# Patient Record
Sex: MALE | Race: WHITE | NOT HISPANIC OR LATINO | Employment: FULL TIME | URBAN - METROPOLITAN AREA
[De-identification: names, ages, dates, MRNs, and addresses within clinical notes are randomized per-mention and may not be internally consistent; named-entity substitution may affect disease eponyms.]

---

## 2018-08-30 PROBLEM — J45.909 ASTHMA: Status: ACTIVE | Noted: 2017-11-27

## 2018-09-04 ENCOUNTER — TELEPHONE (OUTPATIENT)
Dept: PRIMARY CARE | Facility: CLINIC | Age: 23
End: 2018-09-04

## 2018-09-04 NOTE — TELEPHONE ENCOUNTER
Pt called in 9/1/18 c/o L testicle pain that he was seen for but now was getting progressively worse so was advised to return to ER for reeval.

## 2018-11-01 ENCOUNTER — OFFICE VISIT (OUTPATIENT)
Dept: FAMILY MEDICINE | Facility: CLINIC | Age: 23
End: 2018-11-01
Payer: COMMERCIAL

## 2018-11-01 VITALS
HEIGHT: 72 IN | SYSTOLIC BLOOD PRESSURE: 126 MMHG | TEMPERATURE: 98.5 F | BODY MASS INDEX: 24.24 KG/M2 | DIASTOLIC BLOOD PRESSURE: 69 MMHG | OXYGEN SATURATION: 98 % | HEART RATE: 74 BPM | WEIGHT: 179 LBS

## 2018-11-01 DIAGNOSIS — F41.9 ANXIETY: Primary | ICD-10-CM

## 2018-11-01 PROCEDURE — 99213 OFFICE O/P EST LOW 20 MIN: CPT | Performed by: FAMILY MEDICINE

## 2018-11-01 ASSESSMENT — ENCOUNTER SYMPTOMS
FEVER: 0
CHILLS: 0
NERVOUS/ANXIOUS: 1
SHORTNESS OF BREATH: 1
PALPITATIONS: 1
FATIGUE: 0

## 2018-11-01 NOTE — PROGRESS NOTES
[address] [address]  [phone]       Reason for visit: No chief complaint on file.     HPI   Laurent Gray is a 23 y.o. male who presents with mental health issues   - A/D  Feeling overwhelmed - took new job, pay cut, moved out of parents house  Last week felt like he was getting low  Woke up in the AM feeling anxious  Not sleeping well  Increased heart racing, SOB  Wants to get out of ahead of it before it gets worse  Happened in college sometimes and it would paralyze him - not able to complete tasks  Sometimes going to gym helps him - but at times he can't concentrate and needs to leave early  Has never seen therapist before  Has never taken medication before     Past Medical History:   Diagnosis Date   • Asthma      History reviewed. No pertinent surgical history.  Social History     Social History   • Marital status: Single     Spouse name: N/A   • Number of children: N/A   • Years of education: N/A     Occupational History   • Not on file.     Social History Main Topics   • Smoking status: Never Smoker   • Smokeless tobacco: Never Used   • Alcohol use Yes      Comment: socially   • Drug use: No   • Sexual activity: Not on file     Other Topics Concern   • Not on file     Social History Narrative    Do you wear your seatbelt? Yes    Do you have smoke detector in your home? Yes    Do you have a carbon monoxide detector in your home? 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 single         History reviewed. No pertinent family history.  No known allergies  No current outpatient prescriptions on file.     No current facility-administered medications for this visit.        Review of Systems   Constitutional: Negative for chills, fatigue and fever.   Respiratory: Positive for shortness of breath.    Cardiovascular: Positive for palpitations. Negative for chest pain.   Psychiatric/Behavioral: The patient is  nervous/anxious.      Objective   Vitals:    11/01/18 1303   BP: 126/69   BP Location: Left upper arm   Patient Position: Sitting   Pulse: 74   Temp: 36.9 °C (98.5 °F)   TempSrc: Oral   SpO2: 98%   Weight: 81.2 kg (179 lb)   Height: 1.829 m (6')       Physical Exam   Constitutional: He appears well-developed and well-nourished.   Cardiovascular: Normal rate, regular rhythm and normal heart sounds.  Exam reveals no gallop and no friction rub.    No murmur heard.  Pulmonary/Chest: Effort normal and breath sounds normal. He has no wheezes. He has no rales.   Psychiatric: He has a normal mood and affect. His behavior is normal. Judgment and thought content normal.   Vitals reviewed.      Procedures    Lab Results   Component Value Date    WBC 5.1 12/07/2017    HGB 14.8 12/07/2017    HCT 42.2 12/07/2017     12/07/2017    CHOL 186 12/07/2017    TRIG 47 12/07/2017    HDL 79 12/07/2017    ALT 18 12/07/2017    AST 26 12/07/2017     12/07/2017    K 4.2 12/07/2017     12/07/2017    CREATININE 0.94 12/07/2017    BUN 19 12/07/2017    CO2 25 12/07/2017         Assessment   Problem List Items Addressed This Visit     None      Visit Diagnoses     Anxiety    -  Primary    New Problem  Stressed  Feeling overwhelmed  Increased HR, some SOB  Happened before, was debilitating  Will see therapist  Will consider meds              Sebastien Zamora MD  11/1/2018

## 2018-11-01 NOTE — PATIENT INSTRUCTIONS
Schedule appointment with therapist  Let me know if you want to start medication  Make sure you are exercising frequently  Make sure you are getting enough sleep  Limit coffee intake  No energy drinks

## 2019-01-14 ENCOUNTER — OFFICE VISIT (OUTPATIENT)
Dept: FAMILY MEDICINE | Facility: CLINIC | Age: 24
End: 2019-01-14
Payer: COMMERCIAL

## 2019-01-14 VITALS
OXYGEN SATURATION: 98 % | HEIGHT: 70 IN | DIASTOLIC BLOOD PRESSURE: 68 MMHG | SYSTOLIC BLOOD PRESSURE: 126 MMHG | HEART RATE: 63 BPM | TEMPERATURE: 97.4 F | BODY MASS INDEX: 26.28 KG/M2 | WEIGHT: 183.6 LBS

## 2019-01-14 DIAGNOSIS — F41.1 GENERALIZED ANXIETY DISORDER: Primary | ICD-10-CM

## 2019-01-14 PROCEDURE — 99214 OFFICE O/P EST MOD 30 MIN: CPT | Performed by: FAMILY MEDICINE

## 2019-01-14 RX ORDER — SERTRALINE HYDROCHLORIDE 25 MG/1
25 TABLET, FILM COATED ORAL DAILY
Qty: 30 TABLET | Refills: 2 | Status: SHIPPED | OUTPATIENT
Start: 2019-01-14 | End: 2019-03-27 | Stop reason: SDUPTHER

## 2019-01-14 ASSESSMENT — ENCOUNTER SYMPTOMS
HYPERACTIVE: 1
PALPITATIONS: 1
SHORTNESS OF BREATH: 1
DYSPHORIC MOOD: 0
CHILLS: 0
NERVOUS/ANXIOUS: 1
FEVER: 0

## 2019-01-14 NOTE — PATIENT INSTRUCTIONS
Start Sertraline 25 mg daily  Side effects were reviewed  Let me know if anything makes you not want to take it anymore  Come back in 4-6 weeks for physical and check

## 2019-01-14 NOTE — ASSESSMENT & PLAN NOTE
New Problem  Chronic  Has seen therapist - no real improvement  Has changed a lot of things and has not noticed  Lots of triggers  Affecting his daily life  Sertraline 25 daily  SE reviewed  Dad is on this  RTO 4-6 w

## 2019-01-14 NOTE — PROGRESS NOTES
[facility]  [address] [place] [id number]  [phone]       Reason for visit:   Chief Complaint   Patient presents with   • Anxiety      HPI   Laurent Gray is a 23 y.o. male who presents with anxiety   - Anxiety  No improvement since last appt 10 w ago  Saw therapist - does not feel like it is helping  Feels better for 24 hours then back to baseline anxiety  Trouble getting relaxed  Anxious  Triggers - applying to grad school, going to the gym, work  Gym does not help, has changed jobs, moved out of house - no relief  Will change his environment and that doesn't help - going home, parents house  Has not been on medication     Past Medical History:   Diagnosis Date   • Asthma      History reviewed. No pertinent surgical history.  Social History     Social History   • Marital status: Single     Spouse name: N/A   • Number of children: N/A   • Years of education: N/A     Occupational History   • Not on file.     Social History Main Topics   • Smoking status: Never Smoker   • Smokeless tobacco: Never Used   • Alcohol use Yes      Comment: socially   • Drug use: No   • Sexual activity: Not on file     Other Topics Concern   • Not on file     Social History Narrative    Do you wear your seatbelt? Yes    Do you have smoke detector in your home? Yes    Do you have a carbon monoxide detector in your home? 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 single         History reviewed. No pertinent family history.  No known allergies  Current Outpatient Prescriptions   Medication Sig Dispense Refill   • sertraline (ZOLOFT) 25 mg tablet Take 1 tablet (25 mg total) by mouth daily. 30 tablet 2     No current facility-administered medications for this visit.        Review of Systems   Constitutional: Negative for chills and fever.   Respiratory: Positive for shortness of breath.    Cardiovascular: Positive for palpitations. Negative for chest pain.  "  Psychiatric/Behavioral: Negative for dysphoric mood. The patient is nervous/anxious and is hyperactive.      Objective   Vitals:    01/14/19 1604   BP: 126/68   BP Location: Left upper arm   Patient Position: Sitting   Pulse: 63   Temp: 36.3 °C (97.4 °F)   TempSrc: Oral   SpO2: 98%   Weight: 83.3 kg (183 lb 9.6 oz)   Height: 1.778 m (5' 10\")       Physical Exam   Constitutional: He appears well-developed and well-nourished.   Psychiatric: He has a normal mood and affect. His behavior is normal. Judgment and thought content normal.   anxious   Vitals reviewed.      Procedures    Lab Results   Component Value Date    WBC 5.1 12/07/2017    HGB 14.8 12/07/2017    HCT 42.2 12/07/2017     12/07/2017    CHOL 186 12/07/2017    TRIG 47 12/07/2017    HDL 79 12/07/2017    ALT 18 12/07/2017    AST 26 12/07/2017     12/07/2017    K 4.2 12/07/2017     12/07/2017    CREATININE 0.94 12/07/2017    BUN 19 12/07/2017    CO2 25 12/07/2017         Assessment   Problem List Items Addressed This Visit     Generalized anxiety disorder - Primary     New Problem  Chronic  Has seen therapist - no real improvement  Has changed a lot of things and has not noticed  Lots of triggers  Affecting his daily life  Sertraline 25 daily  SE reviewed  Dad is on this  RTO 4-6 w         Relevant Medications    sertraline (ZOLOFT) 25 mg tablet              Sebastien Zamora MD  1/14/2019                     "

## 2019-02-19 ENCOUNTER — OFFICE VISIT (OUTPATIENT)
Dept: FAMILY MEDICINE | Facility: CLINIC | Age: 24
End: 2019-02-19
Payer: COMMERCIAL

## 2019-02-19 VITALS
SYSTOLIC BLOOD PRESSURE: 144 MMHG | BODY MASS INDEX: 26.26 KG/M2 | OXYGEN SATURATION: 98 % | HEART RATE: 66 BPM | TEMPERATURE: 97.5 F | WEIGHT: 183.4 LBS | HEIGHT: 70 IN | DIASTOLIC BLOOD PRESSURE: 66 MMHG

## 2019-02-19 DIAGNOSIS — F41.1 GENERALIZED ANXIETY DISORDER: Primary | ICD-10-CM

## 2019-02-19 PROCEDURE — 99213 OFFICE O/P EST LOW 20 MIN: CPT | Performed by: FAMILY MEDICINE

## 2019-02-19 ASSESSMENT — ENCOUNTER SYMPTOMS
FATIGUE: 0
DYSPHORIC MOOD: 0
FEVER: 0
NERVOUS/ANXIOUS: 1
CHILLS: 0

## 2019-02-19 NOTE — PATIENT INSTRUCTIONS
Glad you have noticed such a great improvement  Continue with medication daily  Come back and see me in 3-4 months for physical  Let me know if any issues

## 2019-02-19 NOTE — PROGRESS NOTES
[address] [address]  [phone]       Reason for visit:   Chief Complaint   Patient presents with   • Follow-up      HPI   Laurent Gray is a 23 y.o. male who presents with anxiety   - Anxiety  Started on Zoloft 5 weeks ago  Feeling better  Less anxious when waking up  Frequency of anxiety less  Less severe  Not seeing therapist anymore  Does not notice any side effects with medication     Past Medical History:   Diagnosis Date   • Asthma      History reviewed. No pertinent surgical history.  Social History     Social History   • Marital status: Single     Spouse name: N/A   • Number of children: N/A   • Years of education: N/A     Occupational History   • Not on file.     Social History Main Topics   • Smoking status: Never Smoker   • Smokeless tobacco: Never Used   • Alcohol use Yes      Comment: socially   • Drug use: No   • Sexual activity: Not on file     Other Topics Concern   • Not on file     Social History Narrative    Do you wear your seatbelt? Yes    Do you have smoke detector in your home? Yes    Do you have a carbon monoxide detector in your home? 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 single         Family History   Problem Relation Age of Onset   • Hypertension Mother    • Anxiety disorder Father      No known allergies  Current Outpatient Prescriptions   Medication Sig Dispense Refill   • sertraline (ZOLOFT) 25 mg tablet Take 1 tablet (25 mg total) by mouth daily. 30 tablet 2     No current facility-administered medications for this visit.        Review of Systems   Constitutional: Negative for chills, fatigue and fever.   Psychiatric/Behavioral: Negative for dysphoric mood. The patient is nervous/anxious.      Objective   Vitals:    02/19/19 1606   BP: (!) 144/66   BP Location: Left upper arm   Patient Position: Sitting   Pulse: 66   Temp: 36.4 °C (97.5 °F)   TempSrc: Oral   SpO2: 98%   Weight: 83.2  "kg (183 lb 6.4 oz)   Height: 1.778 m (5' 10\")       Physical Exam   Constitutional: He appears well-developed and well-nourished.   Psychiatric: He has a normal mood and affect. His behavior is normal. Judgment and thought content normal.   Vitals reviewed.      Procedures    Lab Results   Component Value Date    WBC 5.1 12/07/2017    HGB 14.8 12/07/2017    HCT 42.2 12/07/2017     12/07/2017    CHOL 186 12/07/2017    TRIG 47 12/07/2017    HDL 79 12/07/2017    ALT 18 12/07/2017    AST 26 12/07/2017     12/07/2017    K 4.2 12/07/2017     12/07/2017    CREATININE 0.94 12/07/2017    BUN 19 12/07/2017    CO2 25 12/07/2017         Assessment   Problem List Items Addressed This Visit     Generalized anxiety disorder - Primary     Chronic  Stable  Feeling much better  Taking Zoloft 25 daily  Denies side effects                   Sebastien Zamora MD  2/19/2019                     "

## 2019-03-07 ENCOUNTER — TELEPHONE (OUTPATIENT)
Dept: FAMILY MEDICINE | Facility: CLINIC | Age: 24
End: 2019-03-07

## 2019-03-07 NOTE — TELEPHONE ENCOUNTER
Spoke to patient, he is going thru a rough patch and wanted advice on next step.  Next step would be increasing Zoloft to 50, he will wait it out and see if sxs improve over the next 2 weeks and if no improvement will increase dose to 2 pills daily and follow up 4 w later

## 2019-03-27 ENCOUNTER — TELEPHONE (OUTPATIENT)
Dept: FAMILY MEDICINE | Facility: CLINIC | Age: 24
End: 2019-03-27

## 2019-03-27 DIAGNOSIS — F41.1 GENERALIZED ANXIETY DISORDER: ICD-10-CM

## 2019-03-27 RX ORDER — SERTRALINE HYDROCHLORIDE 25 MG/1
37.5 TABLET, FILM COATED ORAL DAILY
Qty: 30 TABLET | Refills: 2 | COMMUNITY
Start: 2019-03-27 | End: 2019-04-01

## 2019-06-24 ENCOUNTER — TELEPHONE (OUTPATIENT)
Dept: FAMILY MEDICINE | Facility: CLINIC | Age: 24
End: 2019-06-24
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
## 2019-06-24 NOTE — TELEPHONE ENCOUNTER
Spoke to patient.  He will try drinking milk, taking Tums and or Pepto. He will let me know if no improvement or worsening of symptoms

## 2019-07-08 DIAGNOSIS — F41.1 GENERALIZED ANXIETY DISORDER: ICD-10-CM

## 2019-07-08 RX ORDER — SERTRALINE HYDROCHLORIDE 25 MG/1
37.5 TABLET, FILM COATED ORAL
Qty: 45 TABLET | Refills: 2 | Status: SHIPPED | OUTPATIENT
Start: 2019-07-08 | End: 2019-10-16 | Stop reason: SDUPTHER

## 2019-07-17 ENCOUNTER — TELEPHONE (OUTPATIENT)
Dept: FAMILY MEDICINE | Facility: CLINIC | Age: 24
End: 2019-07-17

## 2019-08-12 ENCOUNTER — TELEPHONE (OUTPATIENT)
Dept: FAMILY MEDICINE | Facility: CLINIC | Age: 24
End: 2019-08-12

## 2019-08-14 ENCOUNTER — OFFICE VISIT (OUTPATIENT)
Dept: FAMILY MEDICINE | Facility: CLINIC | Age: 24
End: 2019-08-14
Payer: COMMERCIAL

## 2019-08-14 ENCOUNTER — HOSPITAL ENCOUNTER (OUTPATIENT)
Dept: RADIOLOGY | Facility: HOSPITAL | Age: 24
Discharge: HOME | End: 2019-08-14
Attending: NURSE PRACTITIONER
Payer: COMMERCIAL

## 2019-08-14 VITALS
TEMPERATURE: 98.7 F | WEIGHT: 193.4 LBS | OXYGEN SATURATION: 98 % | DIASTOLIC BLOOD PRESSURE: 85 MMHG | SYSTOLIC BLOOD PRESSURE: 132 MMHG | HEIGHT: 70 IN | HEART RATE: 67 BPM | BODY MASS INDEX: 27.69 KG/M2

## 2019-08-14 DIAGNOSIS — S09.92XA INJURY OF NOSE, INITIAL ENCOUNTER: ICD-10-CM

## 2019-08-14 DIAGNOSIS — S69.91XD HAND INJURY, RIGHT, SUBSEQUENT ENCOUNTER: ICD-10-CM

## 2019-08-14 DIAGNOSIS — S09.92XA INJURY OF NOSE, INITIAL ENCOUNTER: Primary | ICD-10-CM

## 2019-08-14 PROCEDURE — 70160 X-RAY EXAM OF NASAL BONES: CPT

## 2019-08-14 PROCEDURE — 99213 OFFICE O/P EST LOW 20 MIN: CPT | Performed by: NURSE PRACTITIONER

## 2019-08-14 ASSESSMENT — ENCOUNTER SYMPTOMS
ACTIVITY CHANGE: 0
HEADACHES: 0
SHORTNESS OF BREATH: 0
DIZZINESS: 0
LIGHT-HEADEDNESS: 0
CHILLS: 0
FACIAL SWELLING: 0
WOUND: 0
NUMBNESS: 0
FEVER: 0
FATIGUE: 0
APPETITE CHANGE: 0
RHINORRHEA: 0
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
## 2019-10-09 ENCOUNTER — OFFICE VISIT (OUTPATIENT)
Dept: FAMILY MEDICINE | Facility: CLINIC | Age: 24
End: 2019-10-09
Payer: COMMERCIAL

## 2019-10-09 VITALS
DIASTOLIC BLOOD PRESSURE: 80 MMHG | WEIGHT: 191.8 LBS | TEMPERATURE: 98.5 F | HEIGHT: 70 IN | HEART RATE: 71 BPM | OXYGEN SATURATION: 97 % | BODY MASS INDEX: 27.46 KG/M2 | SYSTOLIC BLOOD PRESSURE: 126 MMHG

## 2019-10-09 DIAGNOSIS — S69.91XA INJURY OF FINGER OF RIGHT HAND, INITIAL ENCOUNTER: Primary | ICD-10-CM

## 2019-10-09 PROCEDURE — 99213 OFFICE O/P EST LOW 20 MIN: CPT | Performed by: NURSE PRACTITIONER

## 2019-10-09 ASSESSMENT — ENCOUNTER SYMPTOMS
FATIGUE: 0
FEVER: 0
ACTIVITY CHANGE: 0
APPETITE CHANGE: 0
CHILLS: 0

## 2019-10-09 NOTE — PROGRESS NOTES
[place] [address]  [phone]       Reason for visit:   Chief Complaint   Patient presents with   • Hand Pain     right      HPI   Laurent Gray is a 23 y.o. male who presents with right hand injury  Had a previous metacarpal fracture in 8/2019.   He admits that he was not supposed to use his hand for at least another week but was played flag football last night and ended up injuring the same hand.   Right hand 2nd, 3rd and 4th digits: swollen, bruised, DIP tender to touch. Pt with very limited ROM, unable to extend fingers.          Past Medical History:   Diagnosis Date   • Asthma      History reviewed. No pertinent surgical history.  Social History     Social History   • Marital status: Single     Spouse name: N/A   • Number of children: N/A   • Years of education: N/A     Occupational History   • Not on file.     Social History Main Topics   • Smoking status: Never Smoker   • Smokeless tobacco: Never Used   • Alcohol use Yes      Comment: socially   • Drug use: No   • Sexual activity: Not on file     Other Topics Concern   • Not on file     Social History Narrative    Do you wear your seatbelt? Yes    Do you have smoke detector in your home? Yes    Do you have a carbon monoxide detector in your home? 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 single         Family History   Problem Relation Age of Onset   • Hypertension Biological Mother    • Anxiety disorder Biological Father      No known allergies  Current Outpatient Prescriptions   Medication Sig Dispense Refill   • sertraline (ZOLOFT) 25 mg tablet TAKE 1.5 TABLETS (37.5 MG TOTAL) BY MOUTH ONCE DAILY. 45 tablet 2     No current facility-administered medications for this visit.        Review of Systems   Constitutional: Negative for activity change, appetite change, chills, fatigue and fever.   Musculoskeletal:        + Right hand injury, swollen painful fingers      Objective   Vitals:    10/09/19 1449   BP: 126/80   Pulse: 71   Temp: 36.9 °C (98.5 °F)   SpO2: 97%       Physical Exam   Constitutional: He is oriented to person, place, and time. Vital signs are normal. He appears well-developed and well-nourished. He is cooperative.  Non-toxic appearance. He does not have a sickly appearance. He does not appear ill. No distress.   Musculoskeletal:   + right hand - 2nd, 3rd, 4th digits swollen; ecchymosis, tender DIP joints   Neurological: He is alert and oriented to person, place, and time.   Skin: Skin is warm and dry.   Psychiatric: He has a normal mood and affect. His behavior is normal. Judgment and thought content normal.   Nursing note and vitals reviewed.      Procedures        Assessment   Problem List Items Addressed This Visit     None      Visit Diagnoses     Injury of finger of right hand, initial encounter    -  Primary    Relevant Orders    X-RAY FINGER RIGHT 2+ VIEWS      - Facilitated appointment at [facility] tomorrow 2 PM.  - Pt instructed to elevate extremity, ice application and Ibuprofen PRN with food        MORALES Thomson  10/9/2019

## 2019-10-16 DIAGNOSIS — F41.1 GENERALIZED ANXIETY DISORDER: ICD-10-CM

## 2019-10-16 RX ORDER — SERTRALINE HYDROCHLORIDE 25 MG/1
37.5 TABLET, FILM COATED ORAL
Qty: 45 TABLET | Refills: 2 | Status: SHIPPED | OUTPATIENT
Start: 2019-10-16 | End: 2019-11-07 | Stop reason: SDUPTHER

## 2019-11-07 DIAGNOSIS — F41.1 GENERALIZED ANXIETY DISORDER: ICD-10-CM

## 2019-11-07 RX ORDER — SERTRALINE HYDROCHLORIDE 25 MG/1
37.5 TABLET, FILM COATED ORAL
Qty: 135 TABLET | Refills: 0 | Status: SHIPPED | OUTPATIENT
Start: 2019-11-07 | End: 2020-01-30

## 2020-01-29 DIAGNOSIS — F41.1 GENERALIZED ANXIETY DISORDER: ICD-10-CM

## 2020-01-29 NOTE — TELEPHONE ENCOUNTER
Medicine Refill Request    Last Office Visit: 10/9/2019  Next Office Visit: Visit date not found        Current Outpatient Medications:   •  sertraline (ZOLOFT) 25 mg tablet, Take 1.5 tablets (37.5 mg total) by mouth once daily., Disp: 135 tablet, Rfl: 0      BP Readings from Last 3 Encounters:   10/09/19 126/80   08/14/19 132/85   02/19/19 (!) 144/66       Recent Lab results:  Lab Results   Component Value Date    CHOL 186 12/07/2017   ,   Lab Results   Component Value Date    HDL 79 12/07/2017   ,   Lab Results   Component Value Date    LDLCALC 98 12/07/2017   ,   Lab Results   Component Value Date    TRIG 47 12/07/2017        Lab Results   Component Value Date    GLUCOSE 86 12/07/2017   , No results found for: HGBA1C      Lab Results   Component Value Date    CREATININE 0.94 12/07/2017       No results found for: TSH

## 2020-01-30 RX ORDER — SERTRALINE HYDROCHLORIDE 25 MG/1
37.5 TABLET, FILM COATED ORAL
Qty: 135 TABLET | Refills: 0 | Status: SHIPPED | OUTPATIENT
Start: 2020-01-30 | End: 2020-04-27

## 2020-02-04 ENCOUNTER — TELEPHONE (OUTPATIENT)
Dept: FAMILY MEDICINE | Facility: CLINIC | Age: 25
End: 2020-02-04

## 2020-02-13 ENCOUNTER — OFFICE VISIT (OUTPATIENT)
Dept: FAMILY MEDICINE | Facility: CLINIC | Age: 25
End: 2020-02-13
Payer: COMMERCIAL

## 2020-02-13 VITALS
OXYGEN SATURATION: 99 % | SYSTOLIC BLOOD PRESSURE: 134 MMHG | DIASTOLIC BLOOD PRESSURE: 64 MMHG | BODY MASS INDEX: 28.77 KG/M2 | WEIGHT: 201 LBS | HEART RATE: 68 BPM | TEMPERATURE: 97.5 F | HEIGHT: 70 IN

## 2020-02-13 DIAGNOSIS — Z00.00 ENCOUNTER FOR GENERAL ADULT MEDICAL EXAMINATION WITHOUT ABNORMAL FINDINGS: Primary | ICD-10-CM

## 2020-02-13 PROCEDURE — 90471 IMMUNIZATION ADMIN: CPT | Performed by: FAMILY MEDICINE

## 2020-02-13 PROCEDURE — 99395 PREV VISIT EST AGE 18-39: CPT | Mod: 25 | Performed by: FAMILY MEDICINE

## 2020-02-13 PROCEDURE — 90651 9VHPV VACCINE 2/3 DOSE IM: CPT | Performed by: FAMILY MEDICINE

## 2020-02-13 ASSESSMENT — ENCOUNTER SYMPTOMS
CONSTIPATION: 0
ADENOPATHY: 0
BLOOD IN STOOL: 0
FEVER: 0
NAUSEA: 0
WEAKNESS: 0
DYSURIA: 0
BACK PAIN: 0
NUMBNESS: 0
SORE THROAT: 0
ABDOMINAL PAIN: 0
DIZZINESS: 0
PALPITATIONS: 0
ARTHRALGIAS: 0
VOMITING: 0
FREQUENCY: 0
DIARRHEA: 0
SHORTNESS OF BREATH: 0
CHILLS: 1
RHINORRHEA: 1
COUGH: 1

## 2020-02-13 NOTE — PROGRESS NOTES
[place] [address]  SAMANTHA Correa [address]  [phone]       Reason for visit:   Chief Complaint   Patient presents with   • Annual Exam      HPI   Laurent Gray is a 24 y.o. male who presents with CPX   Updates Yes 10 lb weight gain in 4 months.  20 lbs in 1 year. Unsure how. 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 No pertinent surgical history.  Social History     Socioeconomic History   • Marital status: Single     Spouse name: Not on file   • Number of children: Not on file   • Years of education: Not on file   • Highest education level: Not on file   Occupational History   • Not on file   Social Needs   • Financial resource strain: Not on file   • Food insecurity:     Worry: Not on file     Inability: Not on file   • Transportation needs:     Medical: Not on file     Non-medical: Not on file   Tobacco Use   • Smoking status: Never Smoker   • Smokeless tobacco: Never Used   Substance and Sexual Activity   • Alcohol use: Yes     Comment: 3-4/week   • Drug use: No   • Sexual activity: Yes     Partners: Female     Birth control/protection: IUD   Lifestyle   • Physical activity:     Days per week: Not on file     Minutes per session: Not on file   • Stress: Not on file   Relationships   • Social connections:     Talks on phone: Not on file     Gets together: Not on file     Attends Synagogue service: Not on file     Active member of club or organization: Not on file     Attends meetings of clubs or organizations: Not on file     Relationship status: Not on file   • Intimate partner violence:     Fear of current or ex partner:  "Not on file     Emotionally abused: Not on file     Physically abused: Not on file     Forced sexual activity: Not on file   Other Topics Concern   • Not on file   Social History Narrative    Do you wear your seatbelt? Yes    Do you have smoke detector in your home? Yes    Do you have a carbon monoxide detector in your home? 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 single     Family History   Problem Relation Age of Onset   • Hypertension Biological Mother    • Anxiety disorder Biological Father    • Rectal cancer Maternal Grandmother    • Cancer Paternal Grandfather      No known allergies  Current Outpatient Medications   Medication Sig Dispense Refill   • sertraline (ZOLOFT) 25 mg tablet TAKE 1.5 TABLETS (37.5 MG TOTAL) BY MOUTH ONCE DAILY. 135 tablet 0     No current facility-administered medications for this visit.        Review of Systems   Constitutional: Positive for chills. Negative for fever.   HENT: Positive for rhinorrhea. Negative for congestion and sore throat.    Respiratory: Positive for cough (recent URI). Negative for shortness of breath.    Cardiovascular: Negative for chest pain and palpitations.   Gastrointestinal: Negative for abdominal pain, blood in stool, constipation, diarrhea, nausea and vomiting.   Genitourinary: Negative for dysuria, frequency and testicular pain.   Musculoskeletal: Negative for arthralgias and back pain.   Skin: Negative for rash.   Allergic/Immunologic: Negative for environmental allergies and food allergies.   Neurological: Negative for dizziness, weakness and numbness.   Hematological: Negative for adenopathy.     Objective   Vitals:    02/13/20 1551   BP: 134/64   BP Location: Left upper arm   Patient Position: Sitting   Pulse: 68   Temp: 36.4 °C (97.5 °F)   TempSrc: Oral   SpO2: 99%   Weight: 91.2 kg (201 lb)   Height: 1.778 m (5' 10\")       Physical Exam   Constitutional: He is oriented to person, " place, and time. He appears well-developed and well-nourished.   HENT:   Head: Normocephalic and atraumatic.   Right Ear: Tympanic membrane and ear canal normal.   Left Ear: Tympanic membrane and ear canal normal.   Nose: Nose normal.   Mouth/Throat: Oropharynx is clear and moist and mucous membranes are normal.   Eyes: Pupils are equal, round, and reactive to light. Conjunctivae are normal.   Neck: No thyroid mass and no thyromegaly present.   Cardiovascular: Normal rate, regular rhythm, S1 normal, S2 normal and normal pulses. Exam reveals no gallop and no friction rub.   No murmur heard.  Pulses:       Radial pulses are 2+ on the right side, and 2+ on the left side.   Pulmonary/Chest: Effort normal and breath sounds normal. He has no wheezes. He has no rhonchi. He has no rales.   Abdominal: Soft. Normal appearance and bowel sounds are normal. There is no tenderness. There is no rebound and no guarding.   Musculoskeletal: Normal range of motion.   Lymphadenopathy:     He has no cervical adenopathy.   Neurological: He is alert and oriented to person, place, and time. He has normal strength. No cranial nerve deficit.   Skin:   Multiple macules brown and round in color on back   Psychiatric: He has a normal mood and affect. His speech is normal and behavior is normal. Judgment normal. Cognition and memory are normal.   Nursing note and vitals reviewed.      Procedures    Lab Results   Component Value Date    WBC 5.1 12/07/2017    HGB 14.8 12/07/2017    HCT 42.2 12/07/2017     12/07/2017    CHOL 186 12/07/2017    TRIG 47 12/07/2017    HDL 79 12/07/2017    ALT 18 12/07/2017    AST 26 12/07/2017     12/07/2017    K 4.2 12/07/2017     12/07/2017    CREATININE 0.94 12/07/2017    BUN 19 12/07/2017    CO2 25 12/07/2017         Assessment   Problem List Items Addressed This Visit     None      Visit Diagnoses     Encounter for general adult medical examination without abnormal findings    -  Primary     Adult Male  Notes weight gain, mole?  Active  Eats well  Social EtoH  PMH Anx  FHX MGM rectal; PGF cancer  UTD labs  HPV #1    Relevant Orders    HPV vaccine 9 valent 3 dose IM              Sebastien Zamora MD  2/13/2020

## 2020-02-13 NOTE — PATIENT INSTRUCTIONS
Diet - Try to limit portion sizes, take out, fast food, processed foods. Alcohol can be a expensive source of calories! If these are current problems for you, pick one area and focus on that first!    Exercise - Goal should be 30-40 minutes, 3-4 times a week. If you are currently not exercising, set reachable goals with short term deadlines!    Preventative Care Due - None    Labs Due Today - None    Shots Due Today - HPV #1    Follow up - 1 year or sooner if anything comes up    Make sure you are seeing your specialists, Eye Doctor, Foot Doctor, OBGYN yearly!

## 2020-06-02 ENCOUNTER — CLINICAL SUPPORT (OUTPATIENT)
Dept: FAMILY MEDICINE | Facility: CLINIC | Age: 25
End: 2020-06-02
Payer: COMMERCIAL

## 2020-06-02 ENCOUNTER — TELEPHONE (OUTPATIENT)
Dept: FAMILY MEDICINE | Facility: CLINIC | Age: 25
End: 2020-06-02

## 2020-06-02 PROCEDURE — 90746 HEPB VACCINE 3 DOSE ADULT IM: CPT | Performed by: FAMILY MEDICINE

## 2020-06-02 PROCEDURE — 90471 IMMUNIZATION ADMIN: CPT | Performed by: FAMILY MEDICINE

## 2020-07-09 ENCOUNTER — OFFICE VISIT (OUTPATIENT)
Dept: FAMILY MEDICINE | Facility: CLINIC | Age: 25
End: 2020-07-09
Payer: COMMERCIAL

## 2020-07-09 VITALS
WEIGHT: 206.6 LBS | BODY MASS INDEX: 29.58 KG/M2 | HEIGHT: 70 IN | SYSTOLIC BLOOD PRESSURE: 137 MMHG | HEART RATE: 60 BPM | DIASTOLIC BLOOD PRESSURE: 76 MMHG | TEMPERATURE: 97.6 F | OXYGEN SATURATION: 98 %

## 2020-07-09 DIAGNOSIS — F41.1 GENERALIZED ANXIETY DISORDER: ICD-10-CM

## 2020-07-09 DIAGNOSIS — K64.4 EXTERNAL HEMORRHOIDS: ICD-10-CM

## 2020-07-09 DIAGNOSIS — R63.5 ABNORMAL WEIGHT GAIN: Primary | ICD-10-CM

## 2020-07-09 PROCEDURE — 99214 OFFICE O/P EST MOD 30 MIN: CPT | Mod: 25 | Performed by: FAMILY MEDICINE

## 2020-07-09 PROCEDURE — 90746 HEPB VACCINE 3 DOSE ADULT IM: CPT | Performed by: FAMILY MEDICINE

## 2020-07-09 PROCEDURE — 36415 COLL VENOUS BLD VENIPUNCTURE: CPT | Performed by: FAMILY MEDICINE

## 2020-07-09 PROCEDURE — 90651 9VHPV VACCINE 2/3 DOSE IM: CPT | Performed by: FAMILY MEDICINE

## 2020-07-09 PROCEDURE — 90471 IMMUNIZATION ADMIN: CPT | Performed by: FAMILY MEDICINE

## 2020-07-09 PROCEDURE — 90472 IMMUNIZATION ADMIN EACH ADD: CPT | Performed by: FAMILY MEDICINE

## 2020-07-09 ASSESSMENT — ENCOUNTER SYMPTOMS
BLOOD IN STOOL: 0
ANAL BLEEDING: 1
NUMBNESS: 0
APPETITE CHANGE: 0
HEADACHES: 0
RECTAL PAIN: 1
DIZZINESS: 0
LIGHT-HEADEDNESS: 0
WEAKNESS: 0
VOMITING: 0
UNEXPECTED WEIGHT CHANGE: 1
DIARRHEA: 1
NAUSEA: 0
ACTIVITY CHANGE: 0
CONSTIPATION: 0
FATIGUE: 0
ABDOMINAL PAIN: 0

## 2020-07-09 NOTE — PATIENT INSTRUCTIONS
Try Sitz bath for hemorrhoids  Let me know if you decide you want to try a prescription med  Can also see Colorectal  I will call with labs tomorrow for thyroid  It is very possible that your weight gain is related to Zoloft  We can discuss changing medication if labs OK

## 2020-07-09 NOTE — PROGRESS NOTES
[facility]  [address] [place], [place] [id number]  [phone]       Reason for visit:   Chief Complaint   Patient presents with   • Hemorrhoids     Weight gain, hot and cold flashes      HPI   Laurent Gray is a 24 y.o. male who presents with multiple complaints   - Hemorrhoids  Has not had any symptoms in the last day or so  Frequently sees blood on TP  Over the last 1-2 months has been worse  No blood in stool  Painful, itchy, sometimes burns  Has used OTC steroid cream, Prep H, Medicated wipes with WitchHazel  Gets better for a day or so  Then comes back  Stools are loose more than they are normal  - Temperature Issues/Weight gain  Feels warm all the time  Sweats all day long  Has noticed 15 lb weight gain since 10/2019  23 lb weight gain since 2/2019  Active - works outside a lot; has been doing weights the last week  Peloton since March, running at home  Has been seeing dietician - oatmeal, grilled chicken, veggies, yogurt, nuts     Past Medical History:   Diagnosis Date   • Anxiety    • Asthma      History reviewed. No pertinent surgical history.  Social History     Socioeconomic History   • Marital status: Single     Spouse name: Not on file   • Number of children: Not on file   • Years of education: Not on file   • Highest education level: Not on file   Occupational History   • Not on file   Social Needs   • Financial resource strain: Not on file   • Food insecurity:     Worry: Not on file     Inability: Not on file   • Transportation needs:     Medical: Not on file     Non-medical: Not on file   Tobacco Use   • Smoking status: Never Smoker   • Smokeless tobacco: Never Used   Substance and Sexual Activity   • Alcohol use: Yes     Comment: 3-4/week   • Drug use: No   • Sexual activity: Yes     Partners: Female     Birth control/protection: IUD   Lifestyle   • Physical activity:     Days per week: Not on file     Minutes per session: Not on file   • Stress: Not  on file   Relationships   • Social connections:     Talks on phone: Not on file     Gets together: Not on file     Attends Hinduism service: Not on file     Active member of club or organization: Not on file     Attends meetings of clubs or organizations: Not on file     Relationship status: Not on file   • Intimate partner violence:     Fear of current or ex partner: Not on file     Emotionally abused: Not on file     Physically abused: Not on file     Forced sexual activity: Not on file   Other Topics Concern   • Not on file   Social History Narrative    Do you wear your seatbelt? Yes    Do you have smoke detector in your home? Yes    Do you have a carbon monoxide detector in your home? 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 single     Family History   Problem Relation Age of Onset   • Hypertension Biological Mother    • Anxiety disorder Biological Father    • Rectal cancer Maternal Grandmother    • Cancer Paternal Grandfather      No known allergies  Current Outpatient Medications   Medication Sig Dispense Refill   • sertraline (ZOLOFT) 25 mg tablet TAKE 1.5 TABLETS (37.5 MG TOTAL) BY MOUTH ONCE DAILY. 135 tablet 1     No current facility-administered medications for this visit.        Review of Systems   Constitutional: Positive for unexpected weight change. Negative for activity change, appetite change and fatigue.   Gastrointestinal: Positive for anal bleeding, diarrhea and rectal pain. Negative for abdominal pain, blood in stool, constipation, nausea and vomiting.   Endocrine: Positive for heat intolerance.   Skin: Negative for rash.   Neurological: Negative for dizziness, syncope, weakness, light-headedness, numbness and headaches.     Objective   Vitals:    07/09/20 0927   BP: 137/76   BP Location: Left upper arm   Patient Position: Sitting   Pulse: 60   Temp: 36.4 °C (97.6 °F)   TempSrc: Temporal   SpO2: 98%   Weight: 93.7 kg (206 lb 9.6 oz)  "  Height: 1.778 m (5' 10\")       Physical Exam   Constitutional: He is oriented to person, place, and time. He appears well-developed and well-nourished.   Neck: No thyromegaly present.   Cardiovascular: Normal rate, regular rhythm, normal heart sounds and intact distal pulses. Exam reveals no gallop and no friction rub.   No murmur heard.  Pulmonary/Chest: Effort normal and breath sounds normal. He has no wheezes. He has no rales.   Abdominal: Soft. Bowel sounds are normal. He exhibits no distension and no mass. There is no tenderness. There is no rebound and no guarding.   Genitourinary:   Genitourinary Comments: Patient defers rectal exam   Musculoskeletal: He exhibits no edema.   Neurological: He is alert and oriented to person, place, and time.   Skin: Skin is warm. Capillary refill takes less than 2 seconds.   Vitals reviewed.      Procedures    Lab Results   Component Value Date    WBC 5.1 12/07/2017    HGB 14.8 12/07/2017    HCT 42.2 12/07/2017     12/07/2017    CHOL 186 12/07/2017    TRIG 47 12/07/2017    HDL 79 12/07/2017    ALT 18 12/07/2017    AST 26 12/07/2017     12/07/2017    K 4.2 12/07/2017     12/07/2017    CREATININE 0.94 12/07/2017    BUN 19 12/07/2017    CO2 25 12/07/2017         Assessment   Problem List Items Addressed This Visit        Other    Generalized anxiety disorder     Chronic  Stable  Controlled  Weight gain since starting  T/C switching to Buspar           Other Visit Diagnoses     Abnormal weight gain    -  Primary    New Problem  23 lbs in 18 mo  15 in in 9 mo  Active  Eats well  Checking TSH  Started Zoloft around time of weight gain  If labs OK consider switching to Buspar    Relevant Orders    TSH w reflex FT4    External hemorrhoids        New Problem  Chronic  Irriating  Off and on  Blood on TP  Has tried OTC  Does not want to get Rx  Sitz baths  ColoRectal if continues              Sebastien Zamora MD  7/9/2020                     "

## 2020-07-10 LAB
T4 FREE SERPL-MCNC: 1.03 NG/DL (ref 0.82–1.77)
TSH SERPL DL<=0.005 MIU/L-ACNC: 1.15 UIU/ML (ref 0.45–4.5)

## 2020-07-13 ENCOUNTER — TELEPHONE (OUTPATIENT)
Dept: FAMILY MEDICINE | Facility: CLINIC | Age: 25
End: 2020-07-13

## 2020-08-02 ENCOUNTER — TELEPHONE (OUTPATIENT)
Dept: PRIMARY CARE | Facility: CLINIC | Age: 25
End: 2020-08-02

## 2020-08-02 ENCOUNTER — HOSPITAL ENCOUNTER (EMERGENCY)
Facility: HOSPITAL | Age: 25
Discharge: LEFT BEFORE DISCHARGE | End: 2020-08-02
Payer: COMMERCIAL

## 2020-08-02 NOTE — TELEPHONE ENCOUNTER
Patient called complaining of some disorientation, cough, some shortness of breath, fatigue and myalgias.  Concerned that patient may have coronavirus.  We will send him to [facility] for evaluation and treatment.

## 2020-09-11 ENCOUNTER — TELEPHONE (OUTPATIENT)
Dept: FAMILY MEDICINE | Facility: CLINIC | Age: 25
End: 2020-09-11

## 2020-09-11 NOTE — TELEPHONE ENCOUNTER
[place] ended up in urgent care and had X-Rays done and he broke his right foot.     He states that they wrapped it up with an ace bandage last night and [place].     Please call pt to discuss further

## 2020-10-01 DIAGNOSIS — F41.1 GENERALIZED ANXIETY DISORDER: ICD-10-CM

## 2020-10-01 RX ORDER — SERTRALINE HYDROCHLORIDE 25 MG/1
37.5 TABLET, FILM COATED ORAL
Qty: 135 TABLET | Refills: 1 | Status: SHIPPED | OUTPATIENT
Start: 2020-10-01 | End: 2020-10-05

## 2020-10-05 ENCOUNTER — OFFICE VISIT (OUTPATIENT)
Dept: FAMILY MEDICINE | Facility: CLINIC | Age: 25
End: 2020-10-05
Payer: COMMERCIAL

## 2020-10-05 VITALS
BODY MASS INDEX: 29.92 KG/M2 | HEIGHT: 70 IN | HEART RATE: 69 BPM | OXYGEN SATURATION: 98 % | WEIGHT: 209 LBS | SYSTOLIC BLOOD PRESSURE: 137 MMHG | TEMPERATURE: 97.6 F | DIASTOLIC BLOOD PRESSURE: 74 MMHG

## 2020-10-05 DIAGNOSIS — R63.5 ABNORMAL WEIGHT GAIN: Primary | ICD-10-CM

## 2020-10-05 DIAGNOSIS — F41.1 GENERALIZED ANXIETY DISORDER: ICD-10-CM

## 2020-10-05 PROCEDURE — 90471 IMMUNIZATION ADMIN: CPT | Performed by: FAMILY MEDICINE

## 2020-10-05 PROCEDURE — 90686 IIV4 VACC NO PRSV 0.5 ML IM: CPT | Performed by: FAMILY MEDICINE

## 2020-10-05 PROCEDURE — 99214 OFFICE O/P EST MOD 30 MIN: CPT | Mod: 25 | Performed by: FAMILY MEDICINE

## 2020-10-05 RX ORDER — FAMOTIDINE 20 MG/1
TABLET, FILM COATED ORAL
COMMUNITY
Start: 2020-09-07 | End: 2020-12-08

## 2020-10-05 RX ORDER — ALBUTEROL SULFATE 90 UG/1
INHALANT RESPIRATORY (INHALATION)
COMMUNITY
Start: 2020-08-02 | End: 2024-08-16

## 2020-10-05 RX ORDER — SERTRALINE HYDROCHLORIDE 25 MG/1
25 TABLET, FILM COATED ORAL
Qty: 135 TABLET | Refills: 1 | COMMUNITY
Start: 2020-10-05 | End: 2020-10-20 | Stop reason: SDUPTHER

## 2020-10-05 ASSESSMENT — ENCOUNTER SYMPTOMS
PALPITATIONS: 0
UNEXPECTED WEIGHT CHANGE: 1
NERVOUS/ANXIOUS: 0

## 2020-10-05 NOTE — PROGRESS NOTES
[facility]  [address] [place] [address]  [place] [address]  [phone]       Reason for visit:   Chief Complaint   Patient presents with   • Weight Gain      HPI   Laurent Gray is a 24 y.o. male who presents with weight gain   - Weight Gain  26 lb weight gain since 2/19  3 lb weight gain since appt 7/20  Active - Peloton, running, weights, resistance training  Had food poisoning as well  Still has not been able to lose weight  Gaining weight instead  Saw Dietician - did not matter  Eating well  Sometimes will miss a meal  TSH 1.15 7/20  Taking Zoloft - has been on this for almost 2 years  Anxiety has been better  Has cut back on EtOH     Past Medical History:   Diagnosis Date   • Anxiety    • Asthma      History reviewed. No pertinent surgical history.  Social History     Socioeconomic History   • Marital status: Single     Spouse name: Not on file   • Number of children: Not on file   • Years of education: Not on file   • Highest education level: Not on file   Occupational History   • Not on file   Social Needs   • Financial resource strain: Not on file   • Food insecurity     Worry: Not on file     Inability: Not on file   • Transportation needs     Medical: Not on file     Non-medical: Not on file   Tobacco Use   • Smoking status: Never Smoker   • Smokeless tobacco: Never Used   Substance and Sexual Activity   • Alcohol use: Yes     Comment: 3-4/week   • Drug use: No   • Sexual activity: Yes     Partners: Female     Birth control/protection: I.U.D.   Lifestyle   • Physical activity     Days per week: Not on file     Minutes per session: Not on file   • Stress: Not on file   Relationships   • Social connections     Talks on phone: Not on file     Gets together: Not on file     Attends Zoroastrianism service: Not on file     Active member of club or organization: Not on file     Attends meetings of clubs or organizations: Not on file     Relationship status: Not on file   •  "Intimate partner violence     Fear of current or ex partner: Not on file     Emotionally abused: Not on file     Physically abused: Not on file     Forced sexual activity: Not on file   Other Topics Concern   • Not on file   Social History Narrative    Do you wear your seatbelt? Yes    Do you have smoke detector in your home? Yes    Do you have a carbon monoxide detector in your home? 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 single     Family History   Problem Relation Age of Onset   • Hypertension Biological Mother    • Anxiety disorder Biological Father    • Rectal cancer Maternal Grandmother    • Cancer Paternal Grandfather      No known allergies  Current Outpatient Medications   Medication Sig Dispense Refill   • albuterol HFA (VENTOLIN HFA) 90 mcg/actuation inhaler INHALE 2 PUFFS BY MOUTH EVERY 4 6 HOURS AS NEEDED FOR WHEEZING AND SHORTNESS OF BREATH AS DIRECTED     • famotidine (PEPCID) 20 mg tablet      • sertraline (ZOLOFT) 25 mg tablet Take 1 tablet (25 mg total) by mouth once daily. 135 tablet 1     No current facility-administered medications for this visit.        Review of Systems   Constitutional: Positive for unexpected weight change.   Cardiovascular: Negative for chest pain and palpitations.   Psychiatric/Behavioral: The patient is not nervous/anxious.      Objective   Vitals:    10/05/20 1538   BP: 137/74   BP Location: Right upper arm   Patient Position: Sitting   Pulse: 69   Temp: 36.4 °C (97.6 °F)   TempSrc: Temporal   SpO2: 98%   Weight: 94.8 kg (209 lb)   Height: 1.778 m (5' 10\")       Physical Exam  Constitutional:       General: He is not in acute distress.     Appearance: Normal appearance. He is not ill-appearing, toxic-appearing or diaphoretic.   Neurological:      Mental Status: He is alert.   Psychiatric:         Mood and Affect: Mood normal.         Behavior: Behavior normal.         Thought Content: Thought content normal. "         Judgment: Judgment normal.         Procedures    Lab Results   Component Value Date    WBC 5.1 12/07/2017    HGB 14.8 12/07/2017    HCT 42.2 12/07/2017     12/07/2017    CHOL 186 12/07/2017    TRIG 47 12/07/2017    HDL 79 12/07/2017    ALT 18 12/07/2017    AST 26 12/07/2017     12/07/2017    K 4.2 12/07/2017     12/07/2017    CREATININE 0.94 12/07/2017    BUN 19 12/07/2017    CO2 25 12/07/2017    TSH 1.150 07/09/2020         Assessment   Problem List Items Addressed This Visit        Other    Generalized anxiety disorder     Chronic  Stable  Weight gain  Thought to be from SSRI  Taper down on Zoloft  3 w at 25 daily  3 w at 25 every other day  Then stop  T/C Buspar if needed         Relevant Medications    sertraline (ZOLOFT) 25 mg tablet      Other Visit Diagnoses     Abnormal weight gain    -  Primary    New Problem  26 lb in 20 mo  Taking Zoloft  Started 2 y ago  Will taper Zoloft  TSH was OK  If no improvement - Endo              Sebastien Zamora MD  10/5/2020

## 2020-10-05 NOTE — PATIENT INSTRUCTIONS
Zoloft 25 mg every day x 3 weeks  Then Zoloft 25 every other day x 3 weeks  Hydrate well  Continue with diet, exercise  See me back in 2 months for weight

## 2020-10-06 ENCOUNTER — TELEPHONE (OUTPATIENT)
Dept: FAMILY MEDICINE | Facility: CLINIC | Age: 25
End: 2020-10-06

## 2020-10-20 DIAGNOSIS — F41.1 GENERALIZED ANXIETY DISORDER: ICD-10-CM

## 2020-10-20 RX ORDER — SERTRALINE HYDROCHLORIDE 25 MG/1
25 TABLET, FILM COATED ORAL
Qty: 30 TABLET | Refills: 1 | Status: SHIPPED | OUTPATIENT
Start: 2020-10-20 | End: 2020-11-05 | Stop reason: SDUPTHER

## 2020-11-05 DIAGNOSIS — F41.1 GENERALIZED ANXIETY DISORDER: ICD-10-CM

## 2020-11-05 RX ORDER — SERTRALINE HYDROCHLORIDE 25 MG/1
25 TABLET, FILM COATED ORAL
Qty: 30 TABLET | Refills: 0 | Status: SHIPPED | OUTPATIENT
Start: 2020-11-05 | End: 2020-12-08

## 2020-12-08 ENCOUNTER — TELEMEDICINE (OUTPATIENT)
Dept: FAMILY MEDICINE | Facility: CLINIC | Age: 25
End: 2020-12-08
Payer: COMMERCIAL

## 2020-12-08 DIAGNOSIS — R63.5 ABNORMAL WEIGHT GAIN: Primary | ICD-10-CM

## 2020-12-08 PROCEDURE — 99213 OFFICE O/P EST LOW 20 MIN: CPT | Mod: 95 | Performed by: FAMILY MEDICINE

## 2020-12-08 RX ORDER — MUPIROCIN 20 MG/G
OINTMENT TOPICAL
COMMUNITY
Start: 2020-12-02 | End: 2021-06-14

## 2020-12-08 ASSESSMENT — ENCOUNTER SYMPTOMS
UNEXPECTED WEIGHT CHANGE: 0
DYSPHORIC MOOD: 0
NERVOUS/ANXIOUS: 0

## 2020-12-08 NOTE — PROGRESS NOTES
Verification of Patient Location:  The patient affirms they are currently located in the following state: [place]    Request for Consent:    Audio Only Encounter   You and I are about to have a telemedicine check-in or visit. This is allowed because you have requested it. This telemedicine visit will be billed to your health insurance or you, if you are self-insured. You understand you will be responsible for any copayments or coinsurances that apply to your telemedicine visit. Before starting our telemedicine visit, I am required to get your consent for this virtual check-in or visit by telemedicine. Do you consent?    Patient Response to Request for Consent:  Yes      Visit Documentation:  Subjective     Patient ID: Luarent Gray is a 25 y.o. male.  1995      - Weight Gain  Weighed 209 lbs 2 months ago  Tapered Zoloft down to not needing anything  Feels OK off the Zoloft - better than he thought  Weighing 194 lb  Continues to be active, eating well      The following have been reviewed and updated as appropriate in this visit:  Tobacco  Allergies  Meds  Problems  Med Hx  Surg Hx  Fam Hx  Soc Hx        Review of Systems   Constitutional: Negative for unexpected weight change.   Psychiatric/Behavioral: Negative for dysphoric mood. The patient is not nervous/anxious.          Assessment/Plan   Diagnoses and all orders for this visit:    Abnormal weight gain (Primary)  Comments:  Chronic  Improved  Tapered and stopped Zoloft completely  Has lost 15 lbs  A/D much better  T/C Wellbutrin/Buspar if needs MH meds in future        Time Spent in Medical Discussion During This Encounter:     7 minutes

## 2021-06-14 ENCOUNTER — OFFICE VISIT (OUTPATIENT)
Dept: FAMILY MEDICINE | Facility: CLINIC | Age: 26
End: 2021-06-14
Payer: COMMERCIAL

## 2021-06-14 VITALS
SYSTOLIC BLOOD PRESSURE: 118 MMHG | BODY MASS INDEX: 27.89 KG/M2 | WEIGHT: 194.8 LBS | TEMPERATURE: 98.6 F | OXYGEN SATURATION: 98 % | DIASTOLIC BLOOD PRESSURE: 85 MMHG | HEIGHT: 70 IN | HEART RATE: 66 BPM

## 2021-06-14 DIAGNOSIS — J45.20 MILD INTERMITTENT ASTHMA WITHOUT COMPLICATION: ICD-10-CM

## 2021-06-14 DIAGNOSIS — Z00.00 ENCOUNTER FOR GENERAL ADULT MEDICAL EXAMINATION WITHOUT ABNORMAL FINDINGS: Primary | ICD-10-CM

## 2021-06-14 PROCEDURE — 3008F BODY MASS INDEX DOCD: CPT | Performed by: FAMILY MEDICINE

## 2021-06-14 PROCEDURE — 90471 IMMUNIZATION ADMIN: CPT | Performed by: FAMILY MEDICINE

## 2021-06-14 PROCEDURE — 36415 COLL VENOUS BLD VENIPUNCTURE: CPT | Performed by: FAMILY MEDICINE

## 2021-06-14 PROCEDURE — 99395 PREV VISIT EST AGE 18-39: CPT | Mod: 25 | Performed by: FAMILY MEDICINE

## 2021-06-14 PROCEDURE — 90746 HEPB VACCINE 3 DOSE ADULT IM: CPT | Performed by: FAMILY MEDICINE

## 2021-06-14 PROCEDURE — 90651 9VHPV VACCINE 2/3 DOSE IM: CPT | Performed by: FAMILY MEDICINE

## 2021-06-14 PROCEDURE — 90472 IMMUNIZATION ADMIN EACH ADD: CPT | Performed by: FAMILY MEDICINE

## 2021-06-14 ASSESSMENT — ENCOUNTER SYMPTOMS
DIARRHEA: 0
FEVER: 0
SORE THROAT: 0
CHILLS: 0
NUMBNESS: 0
WEAKNESS: 0
BACK PAIN: 0
ADENOPATHY: 0
DYSURIA: 0
FREQUENCY: 0
SHORTNESS OF BREATH: 0
ARTHRALGIAS: 0
ABDOMINAL PAIN: 0
PALPITATIONS: 0
RHINORRHEA: 0
VOMITING: 0
CONSTIPATION: 0
BLOOD IN STOOL: 0
NAUSEA: 0
DIZZINESS: 0
COUGH: 0

## 2021-06-14 ASSESSMENT — PATIENT HEALTH QUESTIONNAIRE - PHQ9: SUM OF ALL RESPONSES TO PHQ9 QUESTIONS 1 & 2: 0

## 2021-06-14 NOTE — PATIENT INSTRUCTIONS
Diet - Try to limit portion sizes, take out, fast food, processed foods. Alcohol can be a expensive source of calories! If these are current problems for you, pick one area and focus on that first! There is no such thing as a perfect diet. If you are trying to lose weight, it will be difficult to do with foods you do not enjoy.  You may need to try a few different things before finding something.  In general, the diet with the best evidence is the Mediterranean diet. If you have high blood pressure, the DASH diet has good evidence to lower weight and BP.    Exercise - Goal should be 30-40 minutes, 3-4 times a week. If you are currently not exercising, set reachable goals with short term deadlines! The same goes with diet - you are less likely to be successful if you are doing something you don't enjoy.  Weights - even 1-2 pounds! - are great to strengthen bones in old adults.      Preventative Care Due - None    Labs Due Today - Yes    Shots Due Today - Hep B #3, HPV #3    Follow up - 1 year or sooner if anything comes up. We keep same-day sick appointments available every day for last minute problems.  If it is during the week - call us! Often times we can prevent an ER or UC visit! For certain problems, a telemedicine visit can be a great way to see me without having to come into the office or go to an UC!    If you have any specialists such as a Cardiologist, Gastroenterologist for a chronic problem, make sure you are following up with them as recommended!  Diabetics should be having a yearly eye exam by a Optometrist/Ophthalmologist and a foot exam by a Podiatrist!  Women should see their GYN yearly - though you may not need a pap smear done at each visit.  Those with a family history of skin cancer should see a Dermatologist annually.

## 2021-06-14 NOTE — PROGRESS NOTES
[address] [address]  [place] [address]  [phone]       Reason for visit:   Chief Complaint   Patient presents with   • Annual Exam      HPI   Laurent Gray is a 25 y.o. male who presents for his CPX   Medical Updates Yes Has not gained the weight back from stopping his SSRI.    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 No pertinent surgical history.  Social History     Socioeconomic History   • Marital status: Single     Spouse name: Not on file   • Number of children: Not on file   • Years of education: Not on file   • Highest education level: Not on file   Occupational History   • Not on file   Tobacco Use   • Smoking status: Never Smoker   • Smokeless tobacco: Never Used   Substance and Sexual Activity   • Alcohol use: Yes     Comment: 1-2/week   • Drug use: No   • Sexual activity: Yes     Partners: Female     Birth control/protection: I.U.D.   Other Topics Concern   • Not on file   Social History Narrative    Do you wear your seatbelt? Yes    Do you have smoke detector in your home? Yes    Do you have a carbon monoxide detector in your home? 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 single     Social Determinants of Health     Financial Resource Strain:    • Difficulty of Paying Living Expenses:    Food Insecurity:    • Worried About Running Out of Food in the Last Year:    • Ran Out of Food in the Last Year:    Transportation Needs:    • Lack of Transportation (Medical):    • Lack of Transportation (Non-Medical):    Physical Activity:    •  Days of Exercise per Week:    • Minutes of Exercise per Session:    Stress:    • Feeling of Stress :    Social Connections:    • Frequency of Communication with Friends and Family:    • Frequency of Social Gatherings with Friends and Family:    • Attends Sabianism Services:    • Active Member of Clubs or Organizations:    • Attends Club or Organization Meetings:    • Marital Status:    Intimate Partner Violence:    • Fear of Current or Ex-Partner:    • Emotionally Abused:    • Physically Abused:    • Sexually Abused:      Family History   Problem Relation Age of Onset   • Hypertension Biological Mother    • Anxiety disorder Biological Father    • Rectal cancer Maternal Grandmother    • Cancer Paternal Grandfather      No known allergies  Current Outpatient Medications   Medication Sig Dispense Refill   • albuterol HFA (VENTOLIN HFA) 90 mcg/actuation inhaler INHALE 2 PUFFS BY MOUTH EVERY 4 6 HOURS AS NEEDED FOR WHEEZING AND SHORTNESS OF BREATH AS DIRECTED       No current facility-administered medications for this visit.       Review of Systems   Constitutional: Negative for chills and fever.   HENT: Negative for congestion, hearing loss, rhinorrhea and sore throat.    Eyes: Negative for visual disturbance.   Respiratory: Negative for cough and shortness of breath.    Cardiovascular: Negative for chest pain and palpitations.   Gastrointestinal: Negative for abdominal pain, blood in stool, constipation, diarrhea, nausea and vomiting.   Genitourinary: Negative for dysuria, frequency and testicular pain.   Musculoskeletal: Negative for arthralgias and back pain.   Skin: Negative for rash.   Allergic/Immunologic: Negative for environmental allergies and food allergies.   Neurological: Negative for dizziness, weakness and numbness.   Hematological: Negative for adenopathy.     Objective   Vitals:    06/14/21 0826   BP: 118/85   BP Location: Left upper arm   Patient Position: Sitting   Pulse: 66   Temp: 37 °C (98.6 °F)  "  TempSrc: Oral   SpO2: 98%   Weight: 88.4 kg (194 lb 12.8 oz)   Height: 1.778 m (5' 10\")       Physical Exam  Vitals and nursing note reviewed.   Constitutional:       Appearance: Normal appearance. He is well-developed.   HENT:      Head: Normocephalic and atraumatic.      Right Ear: Tympanic membrane and ear canal normal.      Left Ear: Tympanic membrane and ear canal normal.      Nose: Nose normal.   Eyes:      Conjunctiva/sclera: Conjunctivae normal.      Pupils: Pupils are equal, round, and reactive to light.   Neck:      Thyroid: No thyroid mass or thyromegaly.   Cardiovascular:      Rate and Rhythm: Normal rate and regular rhythm.      Pulses: Normal pulses.           Radial pulses are 2+ on the right side and 2+ on the left side.      Heart sounds: S1 normal and S2 normal. No murmur heard.   No friction rub. No gallop.    Pulmonary:      Effort: Pulmonary effort is normal.      Breath sounds: Normal breath sounds. No wheezing, rhonchi or rales.   Abdominal:      General: Bowel sounds are normal.      Palpations: Abdomen is soft.      Tenderness: There is no abdominal tenderness. There is no guarding or rebound.   Musculoskeletal:         General: Normal range of motion.   Lymphadenopathy:      Cervical: No cervical adenopathy.   Neurological:      Mental Status: He is alert and oriented to person, place, and time.      Cranial Nerves: No cranial nerve deficit.   Psychiatric:         Speech: Speech normal.         Behavior: Behavior normal.         Judgment: Judgment normal.         Procedures    Lab Results   Component Value Date    WBC 5.1 12/07/2017    HGB 14.8 12/07/2017    HCT 42.2 12/07/2017     12/07/2017    CHOL 186 12/07/2017    TRIG 47 12/07/2017    HDL 79 12/07/2017    ALT 18 12/07/2017    AST 26 12/07/2017     12/07/2017    K 4.2 12/07/2017     12/07/2017    CREATININE 0.94 12/07/2017    BUN 19 12/07/2017    CO2 25 12/07/2017    TSH 1.150 07/09/2020         Assessment   Problem " List Items Addressed This Visit        Respiratory    Asthma     Chronic            Other    Encounter for general adult medical examination without abnormal findings - Primary     Adult Male  Complaints - None  Diet - Healthy  Activity - Cardio, weights, yoga  Tobacco Use - None  EtOH Use - 1-2/week  Drug Use - None  Sexual Activity - Fiance - IUD  PMH - Asthma, Anxiety  FHX - M HTN; D Anxiety; MGM Rectal Cancer  Labs Ordered - CBC CMP LP HIV Hep C  Immunizations Ordered/Recommended - Hep B #3, HPV #3  Preventative Care Ordered/Recommended - None  Refused - None  Follow up -  1y              Relevant Orders    CBC and Differential    Comprehensive metabolic panel    Hepatitis C antibody    HIV 1,2 AB P24 AG    Lipid panel    Hepatitis B vaccine adult IM (Completed)    HPV vaccine 9 valent 3 dose IM (Completed)              Sebastien Zamora MD  6/14/2021

## 2021-06-15 LAB
ALBUMIN SERPL-MCNC: 5 G/DL (ref 4.1–5.2)
ALBUMIN/GLOB SERPL: 2 {RATIO} (ref 1.2–2.2)
ALP SERPL-CCNC: 75 IU/L (ref 48–121)
ALT SERPL-CCNC: 13 IU/L (ref 0–44)
AST SERPL-CCNC: 21 IU/L (ref 0–40)
BASOPHILS # BLD AUTO: 0 X10E3/UL (ref 0–0.2)
BASOPHILS NFR BLD AUTO: 0 %
BILIRUB SERPL-MCNC: 0.6 MG/DL (ref 0–1.2)
BUN SERPL-MCNC: 14 MG/DL (ref 6–20)
BUN/CREAT SERPL: 13 (ref 9–20)
CALCIUM SERPL-MCNC: 10.4 MG/DL (ref 8.7–10.2)
CHLORIDE SERPL-SCNC: 101 MMOL/L (ref 96–106)
CHOLEST SERPL-MCNC: 189 MG/DL (ref 100–199)
CO2 SERPL-SCNC: 26 MMOL/L (ref 20–29)
CREAT SERPL-MCNC: 1.07 MG/DL (ref 0.76–1.27)
EOSINOPHIL # BLD AUTO: 0.2 X10E3/UL (ref 0–0.4)
EOSINOPHIL NFR BLD AUTO: 2 %
ERYTHROCYTE [DISTWIDTH] IN BLOOD BY AUTOMATED COUNT: 11 % (ref 11.6–15.4)
GLOBULIN SER CALC-MCNC: 2.5 G/DL (ref 1.5–4.5)
GLUCOSE SERPL-MCNC: 100 MG/DL (ref 65–99)
HCT VFR BLD AUTO: 44 % (ref 37.5–51)
HCV AB S/CO SERPL IA: <0.1 S/CO RATIO (ref 0–0.9)
HDLC SERPL-MCNC: 67 MG/DL
HGB BLD-MCNC: 14.8 G/DL (ref 13–17.7)
HIV 1+2 AB+HIV1 P24 AG SERPL QL IA: NON REACTIVE
IMM GRANULOCYTES # BLD AUTO: 0 X10E3/UL (ref 0–0.1)
IMM GRANULOCYTES NFR BLD AUTO: 0 %
LAB CORP EGFR IF AFRICN AM: 110 ML/MIN/1.73
LAB CORP EGFR IF NONAFRICN AM: 95 ML/MIN/1.73
LDLC SERPL CALC-MCNC: 110 MG/DL (ref 0–99)
LYMPHOCYTES # BLD AUTO: 1.4 X10E3/UL (ref 0.7–3.1)
LYMPHOCYTES NFR BLD AUTO: 15 %
MCH RBC QN AUTO: 30.8 PG (ref 26.6–33)
MCHC RBC AUTO-ENTMCNC: 33.6 G/DL (ref 31.5–35.7)
MCV RBC AUTO: 92 FL (ref 79–97)
MONOCYTES # BLD AUTO: 0.4 X10E3/UL (ref 0.1–0.9)
MONOCYTES NFR BLD AUTO: 5 %
NEUTROPHILS # BLD AUTO: 7.1 X10E3/UL (ref 1.4–7)
NEUTROPHILS NFR BLD AUTO: 78 %
PLATELET # BLD AUTO: 222 X10E3/UL (ref 150–450)
POTASSIUM SERPL-SCNC: 4.7 MMOL/L (ref 3.5–5.2)
PROT SERPL-MCNC: 7.5 G/DL (ref 6–8.5)
RBC # BLD AUTO: 4.8 X10E6/UL (ref 4.14–5.8)
SODIUM SERPL-SCNC: 139 MMOL/L (ref 134–144)
TRIGL SERPL-MCNC: 63 MG/DL (ref 0–149)
VLDLC SERPL CALC-MCNC: 12 MG/DL (ref 5–40)
WBC # BLD AUTO: 9.1 X10E3/UL (ref 3.4–10.8)

## 2021-07-27 ENCOUNTER — OFFICE VISIT (OUTPATIENT)
Dept: FAMILY MEDICINE | Facility: CLINIC | Age: 26
End: 2021-07-27
Payer: COMMERCIAL

## 2021-07-27 VITALS
BODY MASS INDEX: 28.32 KG/M2 | HEIGHT: 70 IN | SYSTOLIC BLOOD PRESSURE: 124 MMHG | DIASTOLIC BLOOD PRESSURE: 77 MMHG | TEMPERATURE: 98.2 F | WEIGHT: 197.8 LBS | HEART RATE: 71 BPM | OXYGEN SATURATION: 98 %

## 2021-07-27 DIAGNOSIS — L02.219 SUPRAPUBIC ABSCESS: Primary | ICD-10-CM

## 2021-07-27 DIAGNOSIS — L98.0 PYOGENIC GRANULOMA: ICD-10-CM

## 2021-07-27 PROCEDURE — 3008F BODY MASS INDEX DOCD: CPT | Performed by: FAMILY MEDICINE

## 2021-07-27 PROCEDURE — 99212 OFFICE O/P EST SF 10 MIN: CPT | Performed by: FAMILY MEDICINE

## 2021-07-27 ASSESSMENT — ENCOUNTER SYMPTOMS
FEVER: 0
CHILLS: 0

## 2021-07-27 NOTE — PROGRESS NOTES
[address] [address]  [phone]       Reason for visit:   Chief Complaint   Patient presents with   • Illness      HPI   Laurent Gray is a 25 y.o. male who presents with lump   - Lump  Noticed it 2 d ago  Has gotten smaller  Right at base of penis  Mild discharge  Painful - has improved  Only one spot that was painful  Denies urinary symptoms, testicular pain  Denies F/C  Biggest was size of quarter  Now about size of nickel     Past Medical History:   Diagnosis Date   • Anxiety    • Asthma      History reviewed. No pertinent surgical history.  Social History     Socioeconomic History   • Marital status: Single     Spouse name: Not on file   • Number of children: Not on file   • Years of education: Not on file   • Highest education level: Not on file   Occupational History   • Not on file   Tobacco Use   • Smoking status: Never Smoker   • Smokeless tobacco: Never Used   Substance and Sexual Activity   • Alcohol use: Yes     Comment: 1-2/week   • Drug use: No   • Sexual activity: Yes     Partners: Female     Birth control/protection: I.U.D.   Other Topics Concern   • Not on file   Social History Narrative    Do you wear your seatbelt? Yes    Do you have smoke detector in your home? Yes    Do you have a carbon monoxide detector in your home? 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 single     Social Determinants of Health     Financial Resource Strain:    • Difficulty of Paying Living Expenses:    Food Insecurity:    • Worried About Running Out of Food in the Last Year:    • Ran Out of Food in the Last Year:    Transportation Needs:    • Lack of Transportation (Medical):    • Lack of Transportation (Non-Medical):    Physical Activity:    • Days of Exercise per Week:    • Minutes of Exercise per Session:    Stress:    • Feeling of Stress :    Social Connections:    • Frequency of  "Communication with Friends and Family:    • Frequency of Social Gatherings with Friends and Family:    • Attends Mormonism Services:    • Active Member of Clubs or Organizations:    • Attends Club or Organization Meetings:    • Marital Status:    Intimate Partner Violence:    • Fear of Current or Ex-Partner:    • Emotionally Abused:    • Physically Abused:    • Sexually Abused:      Family History   Problem Relation Age of Onset   • Hypertension Biological Mother    • Anxiety disorder Biological Father    • Rectal cancer Maternal Grandmother    • Cancer Paternal Grandfather      No known allergies  Current Outpatient Medications   Medication Sig Dispense Refill   • albuterol HFA (VENTOLIN HFA) 90 mcg/actuation inhaler INHALE 2 PUFFS BY MOUTH EVERY 4 6 HOURS AS NEEDED FOR WHEEZING AND SHORTNESS OF BREATH AS DIRECTED       No current facility-administered medications for this visit.       Review of Systems   Constitutional: Negative for chills and fever.   Skin: Positive for rash.     Objective   Vitals:    07/27/21 1404   BP: 124/77   BP Location: Left upper arm   Patient Position: Sitting   Pulse: 71   Temp: 36.8 °C (98.2 °F)   TempSrc: Oral   SpO2: 98%   Weight: 89.7 kg (197 lb 12.8 oz)   Height: 1.778 m (5' 10\")       Physical Exam  Skin:     Comments: Midline 1 cm round nodule just above of penis without erythema, discharge or warmth. Mild tenderness when squeezing.   Neurological:      Mental Status: He is alert.         Procedures    Lab Results   Component Value Date    WBC 9.1 06/14/2021    HGB 14.8 06/14/2021    HCT 44.0 06/14/2021     06/14/2021    CHOL 189 06/14/2021    TRIG 63 06/14/2021    HDL 67 06/14/2021    ALT 13 06/14/2021    AST 21 06/14/2021     06/14/2021    K 4.7 06/14/2021     06/14/2021    CREATININE 1.07 06/14/2021    BUN 14 06/14/2021    CO2 26 06/14/2021    TSH 1.150 07/09/2020         Assessment   Problem List Items Addressed This Visit     None      Visit Diagnoses     " Suprapubic abscess    -  Primary    New Problem  x 3-4 d   Resolving  Was painful, bigger  Had discharge  No signs of active infection  Reassurance  WS reviewed    Pyogenic granuloma                  Sebastien Zamora MD  7/27/2021

## 2021-07-27 NOTE — PATIENT INSTRUCTIONS
Let me know if area gets larger, more painful, red, or develops discharge  Leave it alone - it should go away on its own over the next few weeks

## 2021-08-20 ENCOUNTER — APPOINTMENT (RX ONLY)
Dept: URBAN - METROPOLITAN AREA CLINIC 28 | Facility: CLINIC | Age: 26
Setting detail: DERMATOLOGY
End: 2021-08-20

## 2021-08-20 DIAGNOSIS — L91.0 HYPERTROPHIC SCAR: ICD-10-CM

## 2021-08-20 DIAGNOSIS — B07.8 OTHER VIRAL WARTS: ICD-10-CM

## 2021-08-20 PROCEDURE — ? PHOTO-DOCUMENTATION

## 2021-08-20 PROCEDURE — ? BENIGN DESTRUCTION

## 2021-08-20 PROCEDURE — 11900 INJECT SKIN LESIONS </W 7: CPT | Mod: 59

## 2021-08-20 PROCEDURE — 17110 DESTRUCTION B9 LES UP TO 14: CPT

## 2021-08-20 PROCEDURE — ? INTRALESIONAL KENALOG

## 2021-08-20 PROCEDURE — ? COUNSELING

## 2021-08-20 ASSESSMENT — LOCATION ZONE DERM
LOCATION ZONE: FINGER
LOCATION ZONE: TRUNK

## 2021-08-20 ASSESSMENT — LOCATION DETAILED DESCRIPTION DERM
LOCATION DETAILED: LEFT MEDIAL INFERIOR CHEST
LOCATION DETAILED: RIGHT MIDDLE FINGERTIP

## 2021-08-20 ASSESSMENT — LOCATION SIMPLE DESCRIPTION DERM
LOCATION SIMPLE: RIGHT MIDDLE FINGER
LOCATION SIMPLE: CHEST

## 2021-08-20 NOTE — PROCEDURE: BENIGN DESTRUCTION
Render Note In Bullet Format When Appropriate: No
Post-Care Instructions: I reviewed with the patient in detail post-care instructions. Patient is to wear sunprotection, and avoid picking at any of the treated lesions. Pt may apply Vaseline to crusted or scabbing areas.
Detail Level: Detailed
Anesthesia Volume In Cc: 0
Treatment Number (Will Not Render If 0): 1
Consent: The patient's consent was obtained including but not limited to risks of crusting, scabbing, blistering, scarring, darker or lighter pigmentary change, recurrence, incomplete removal and infection.
Medical Necessity Clause: This procedure was medically necessary because the lesions that were treated were:
Medical Necessity Information: It is in your best interest to select a reason for this procedure from the list below. All of these items fulfill various CMS LCD requirements except the new and changing color options.

## 2022-07-12 ENCOUNTER — OFFICE VISIT (OUTPATIENT)
Dept: FAMILY MEDICINE | Facility: CLINIC | Age: 27
End: 2022-07-12
Payer: COMMERCIAL

## 2022-07-12 VITALS
OXYGEN SATURATION: 98 % | DIASTOLIC BLOOD PRESSURE: 83 MMHG | WEIGHT: 202.8 LBS | TEMPERATURE: 98.5 F | BODY MASS INDEX: 29.03 KG/M2 | HEIGHT: 70 IN | HEART RATE: 76 BPM | SYSTOLIC BLOOD PRESSURE: 129 MMHG

## 2022-07-12 DIAGNOSIS — J45.20 MILD INTERMITTENT ASTHMA WITHOUT COMPLICATION: ICD-10-CM

## 2022-07-12 DIAGNOSIS — Z00.00 ENCOUNTER FOR GENERAL ADULT MEDICAL EXAMINATION WITHOUT ABNORMAL FINDINGS: Primary | ICD-10-CM

## 2022-07-12 PROCEDURE — 3008F BODY MASS INDEX DOCD: CPT | Performed by: FAMILY MEDICINE

## 2022-07-12 PROCEDURE — 99395 PREV VISIT EST AGE 18-39: CPT | Performed by: FAMILY MEDICINE

## 2022-07-12 ASSESSMENT — ENCOUNTER SYMPTOMS
FEVER: 0
CHILLS: 0
ADENOPATHY: 0
NUMBNESS: 0
NAUSEA: 0
SORE THROAT: 0
BACK PAIN: 0
FREQUENCY: 0
VOMITING: 0
BLOOD IN STOOL: 0
WEAKNESS: 0
PALPITATIONS: 0
COUGH: 0
ARTHRALGIAS: 0
CONSTIPATION: 0
ABDOMINAL PAIN: 0
DYSURIA: 0
RHINORRHEA: 0
DIZZINESS: 0
SHORTNESS OF BREATH: 0
DIARRHEA: 0

## 2022-07-12 ASSESSMENT — PATIENT HEALTH QUESTIONNAIRE - PHQ9: SUM OF ALL RESPONSES TO PHQ9 QUESTIONS 1 & 2: 0

## 2022-07-12 NOTE — PROGRESS NOTES
[address] [address]  [phone]       Reason for visit:   Chief Complaint   Patient presents with   • Annual Exam      HPI   Laurent Gray is a 26 y.o. male who presents for his CPX   Medical Updates No Having Deviated Septum repair this Summer.    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 No pertinent surgical history.  Social History     Socioeconomic History   • Marital status: Single     Spouse name: Not on file   • Number of children: Not on file   • Years of education: Not on file   • Highest education level: Not on file   Occupational History   • Not on file   Tobacco Use   • Smoking status: Never Smoker   • Smokeless tobacco: Never Used   Substance and Sexual Activity   • Alcohol use: Yes     Comment: 1-2/week   • Drug use: No   • Sexual activity: Yes     Partners: Female     Birth control/protection: I.U.D.   Other Topics Concern   • Not on file   Social History Narrative    Do you wear your seatbelt? Yes    Do you have smoke detector in your home? Yes    Do you have a carbon monoxide detector in your home? 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 single     Social Determinants of Health     Financial Resource Strain: Not on file   Food Insecurity: Not on file   Transportation Needs: Not on file   Physical Activity: Not on file   Stress: Not on file   Social Connections: Not on file   Intimate Partner Violence: Not on file   Housing Stability: Not on file     Family History   Problem  "Relation Age of Onset   • Hypertension Biological Mother    • Anxiety disorder Biological Father    • Rectal cancer Maternal Grandmother    • Cancer Paternal Grandfather      No known allergies  Current Outpatient Medications   Medication Sig Dispense Refill   • albuterol HFA (VENTOLIN HFA) 90 mcg/actuation inhaler INHALE 2 PUFFS BY MOUTH EVERY 4 6 HOURS AS NEEDED FOR WHEEZING AND SHORTNESS OF BREATH AS DIRECTED       No current facility-administered medications for this visit.       Review of Systems   Constitutional: Negative for chills and fever.   HENT: Negative for congestion, hearing loss, rhinorrhea and sore throat.    Eyes: Negative for visual disturbance.   Respiratory: Negative for cough and shortness of breath.    Cardiovascular: Negative for chest pain and palpitations.   Gastrointestinal: Negative for abdominal pain, blood in stool, constipation, diarrhea, nausea and vomiting.   Genitourinary: Negative for dysuria, frequency and testicular pain.   Musculoskeletal: Negative for arthralgias and back pain.   Skin: Negative for rash.   Allergic/Immunologic: Negative for environmental allergies and food allergies.   Neurological: Negative for dizziness, weakness and numbness.   Hematological: Negative for adenopathy.     Objective   Vitals:    07/12/22 1304   BP: 129/83   BP Location: Left upper arm   Patient Position: Sitting   Pulse: 76   Temp: 36.9 °C (98.5 °F)   TempSrc: Oral   SpO2: 98%   Weight: 92 kg (202 lb 12.8 oz)   Height: 1.778 m (5' 10\")       Physical Exam  Vitals and nursing note reviewed.   Constitutional:       Appearance: Normal appearance. He is well-developed.   HENT:      Head: Normocephalic and atraumatic.      Right Ear: Tympanic membrane and ear canal normal.      Left Ear: Tympanic membrane and ear canal normal.      Nose: Nose normal.   Eyes:      Conjunctiva/sclera: Conjunctivae normal.      Pupils: Pupils are equal, round, and reactive to light.   Neck:      Thyroid: No thyroid " mass or thyromegaly.   Cardiovascular:      Rate and Rhythm: Normal rate and regular rhythm.      Pulses: Normal pulses.           Radial pulses are 2+ on the right side and 2+ on the left side.      Heart sounds: S1 normal and S2 normal. No murmur heard.    No friction rub. No gallop.   Pulmonary:      Effort: Pulmonary effort is normal.      Breath sounds: Normal breath sounds. No wheezing, rhonchi or rales.   Abdominal:      General: Bowel sounds are normal.      Palpations: Abdomen is soft.      Tenderness: There is no abdominal tenderness. There is no guarding or rebound.   Musculoskeletal:         General: Normal range of motion.   Lymphadenopathy:      Cervical: No cervical adenopathy.   Neurological:      Mental Status: He is alert and oriented to person, place, and time.      Cranial Nerves: No cranial nerve deficit.   Psychiatric:         Speech: Speech normal.         Behavior: Behavior normal.         Judgment: Judgment normal.         Procedures    Lab Results   Component Value Date    WBC 9.1 06/14/2021    HGB 14.8 06/14/2021    HCT 44.0 06/14/2021     06/14/2021    CHOL 189 06/14/2021    TRIG 63 06/14/2021    HDL 67 06/14/2021    ALT 13 06/14/2021    AST 21 06/14/2021     06/14/2021    K 4.7 06/14/2021     06/14/2021    CREATININE 1.07 06/14/2021    BUN 14 06/14/2021    CO2 26 06/14/2021    TSH 1.150 07/09/2020         Assessment   Problem List Items Addressed This Visit        Respiratory    Asthma       Other    Encounter for general adult medical examination without abnormal findings - Primary     Adult Male  Complaints - None  Diet - Healthy  Activity - Cardio, weights, yoga  Tobacco Use - None  EtOH Use - 1-2/week  Drug Use - None  Sexual Activity - Fiance - IUD  PMH - Asthma, Anxiety  FHX - M HTN; D Anxiety; MGM Rectal Cancer  Labs Ordered - NOne  Immunizations Ordered/Recommended - None Preventative Care Ordered/Recommended - None  Refused - None  Follow up -  1y                       Sebastien Zamora MD  7/12/2022

## 2022-07-12 NOTE — PATIENT INSTRUCTIONS
Diet - Try to limit portion sizes, take out, fast food, processed foods. Alcohol can be a expensive source of calories! If these are current problems for you, pick one area and focus on that first! There is no such thing as a perfect diet. If you are trying to lose weight, it will be difficult to do with foods you do not enjoy.  You may need to try a few different things before finding something.  In general, the diet with the best evidence is the Mediterranean diet. If you have high blood pressure, the DASH diet has good evidence to lower weight and BP.    Exercise - Goal should be 30-40 minutes, 3-4 times a week. If you are currently not exercising, set reachable goals with short term deadlines! The same goes with diet - you are less likely to be successful if you are doing something you don't enjoy.  Weights - even 1-2 pounds! - are great to strengthen bones in old adults.      Preventative Care Due - None    Labs Due Today - None    Shots Due Today - None     Follow up - 1 year or sooner if anything comes up. We keep same-day sick appointments available every day for last minute problems.  If it is during the week - call us! Often times we can prevent an ER or UC visit! For certain problems, a telemedicine visit can be a great way to see me without having to come into the office or go to an UC!    If you have any specialists such as a Cardiologist, Gastroenterologist for a chronic problem, make sure you are following up with them as recommended!  Diabetics should be having a yearly eye exam by a Optometrist/Ophthalmologist and a foot exam by a Podiatrist!  Women should see their GYN yearly - though you may not need a pap smear done at each visit.  Those with a family history of skin cancer should see a Dermatologist annually.

## 2022-08-07 NOTE — ASSESSMENT & PLAN NOTE
Able to reach pt, message below conveyed, medications ordered and sent to pt preferred pharmacy. Verbal STD education given to pt. Pt provided teach back. Pt verbalized understanding     Adult Male  Complaints - None  Diet - Healthy  Activity - Cardio, weights, yoga  Tobacco Use - None  EtOH Use - 1-2/week  Drug Use - None  Sexual Activity - Fiance - IUD  PMH - Asthma, Anxiety  FHX - M HTN; D Anxiety; MGM Rectal Cancer  Labs Ordered - NOne  Immunizations Ordered/Recommended - None Preventative Care Ordered/Recommended - None  Refused - None  Follow up -  1y

## 2023-01-12 ENCOUNTER — OFFICE VISIT (OUTPATIENT)
Dept: FAMILY MEDICINE | Facility: CLINIC | Age: 28
End: 2023-01-12
Payer: COMMERCIAL

## 2023-01-12 VITALS
DIASTOLIC BLOOD PRESSURE: 75 MMHG | TEMPERATURE: 98.4 F | OXYGEN SATURATION: 99 % | BODY MASS INDEX: 28.31 KG/M2 | SYSTOLIC BLOOD PRESSURE: 135 MMHG | WEIGHT: 209 LBS | HEART RATE: 70 BPM | HEIGHT: 72 IN

## 2023-01-12 DIAGNOSIS — J45.20 MILD INTERMITTENT ASTHMA WITHOUT COMPLICATION: ICD-10-CM

## 2023-01-12 DIAGNOSIS — G89.29 CHRONIC PAIN OF RIGHT KNEE: Primary | ICD-10-CM

## 2023-01-12 DIAGNOSIS — M25.561 CHRONIC PAIN OF RIGHT KNEE: Primary | ICD-10-CM

## 2023-01-12 PROCEDURE — 99213 OFFICE O/P EST LOW 20 MIN: CPT | Performed by: FAMILY MEDICINE

## 2023-01-12 PROCEDURE — 3008F BODY MASS INDEX DOCD: CPT | Performed by: FAMILY MEDICINE

## 2023-01-12 ASSESSMENT — ENCOUNTER SYMPTOMS
WEAKNESS: 0
JOINT SWELLING: 0
ACTIVITY CHANGE: 1
NUMBNESS: 0
ARTHRALGIAS: 1

## 2023-01-12 NOTE — PROGRESS NOTES
[address] [id number]  [phone]       Reason for visit:   Chief Complaint   Patient presents with   • Knee Pain     right      HPI   Laurent Gray is a 27 y.o. male who presents with knee pain   - Knee Pain  Noticed it a while ago  Right knee  Had episode 4 mo ago  Buckled, popped while walking  Knee intermittently popped, buckled for a few weeks  Had improved and stopped being an issue  Sometimes feels unstable  Gets a pain anteriorly  Has tried to change his gait with running  Denies swelling  Has taken Ibuprofen as needed rarely - can help  Has not used ice, heat or brace   Past Medical History:   Diagnosis Date   • Anxiety    • Asthma      History reviewed. No pertinent surgical history.  Social History     Socioeconomic History   • Marital status: Single     Spouse name: Not on file   • Number of children: Not on file   • Years of education: Not on file   • Highest education level: Not on file   Occupational History   • Not on file   Tobacco Use   • Smoking status: Never   • Smokeless tobacco: Never   Vaping Use   • Vaping Use: Never used   Substance and Sexual Activity   • Alcohol use: Yes     Comment: 1-2/week   • Drug use: No   • Sexual activity: Yes     Partners: Female     Birth control/protection: I.U.D.   Other Topics Concern   • Not on file   Social History Narrative    Do you wear your seatbelt? Yes    Do you have smoke detector in your home? Yes    Do you have a carbon monoxide detector in your home? 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 single     Social Determinants of Health     Financial Resource Strain: Not on file   Food Insecurity: Not on file   Transportation Needs: Not on file   Physical Activity: Not on file   Stress: Not on file   Social Connections: Not on file   Intimate Partner Violence: Not on file   Housing Stability: Not on file     Family  History   Problem Relation Age of Onset   • Hypertension Biological Mother    • Anxiety disorder Biological Father    • Rectal cancer Maternal Grandmother    • Cancer Paternal Grandfather      No known allergies  Current Outpatient Medications   Medication Sig Dispense Refill   • albuterol HFA (VENTOLIN HFA) 90 mcg/actuation inhaler INHALE 2 PUFFS BY MOUTH EVERY 4 6 HOURS AS NEEDED FOR WHEEZING AND SHORTNESS OF BREATH AS DIRECTED       No current facility-administered medications for this visit.       Review of Systems   Constitutional: Positive for activity change.   Musculoskeletal: Positive for arthralgias. Negative for joint swelling.   Neurological: Negative for weakness and numbness.     Objective   Vitals:    01/12/23 1516   BP: 135/75   BP Location: Left upper arm   Patient Position: Sitting   Pulse: 70   Temp: 36.9 °C (98.4 °F)   TempSrc: Oral   SpO2: 99%   Weight: 94.8 kg (209 lb)   Height: 1.829 m (6')       Physical Exam  Musculoskeletal:         General: No swelling.      Comments: No swelling of knee. Full ROM.  TTP medial JL. Neg ant and post drawer.  Neg varus valgus. Neg Ming   Neurological:      Mental Status: He is alert.         Procedures    Lab Results   Component Value Date    WBC 9.1 06/14/2021    HGB 14.8 06/14/2021    HCT 44.0 06/14/2021     06/14/2021    CHOL 189 06/14/2021    TRIG 63 06/14/2021    HDL 67 06/14/2021    ALT 13 06/14/2021    AST 21 06/14/2021     06/14/2021    K 4.7 06/14/2021     06/14/2021    CREATININE 1.07 06/14/2021    BUN 14 06/14/2021    CO2 26 06/14/2021    TSH 1.150 07/09/2020         Assessment   Problem List Items Addressed This Visit        Respiratory    Asthma   Other Visit Diagnoses     Chronic pain of right knee    -  Primary    New Problem  R knee  No real inciting event  Maybe popping 3 mo ago - walking  No swelling  Med JL TTP  Neg ant, post drawer  Neg ming  SM miko Zamora MD  1/12/2023

## 2023-01-30 ENCOUNTER — TELEPHONE (OUTPATIENT)
Dept: FAMILY MEDICINE | Facility: CLINIC | Age: 28
End: 2023-01-30

## 2023-01-30 DIAGNOSIS — M25.561 CHRONIC PAIN OF RIGHT KNEE: Primary | ICD-10-CM

## 2023-01-30 DIAGNOSIS — G89.29 CHRONIC PAIN OF RIGHT KNEE: Primary | ICD-10-CM

## 2023-01-30 NOTE — TELEPHONE ENCOUNTER
Patient called. Capitated to [facility] and needs order for physical therapy for his right knee faxed to [phone]

## 2023-07-28 ENCOUNTER — OFFICE VISIT (OUTPATIENT)
Dept: FAMILY MEDICINE | Facility: CLINIC | Age: 28
End: 2023-07-28
Payer: COMMERCIAL

## 2023-07-28 VITALS
HEIGHT: 72 IN | SYSTOLIC BLOOD PRESSURE: 121 MMHG | BODY MASS INDEX: 26.95 KG/M2 | DIASTOLIC BLOOD PRESSURE: 70 MMHG | TEMPERATURE: 98 F | HEART RATE: 76 BPM | WEIGHT: 199 LBS | OXYGEN SATURATION: 98 %

## 2023-07-28 DIAGNOSIS — Z00.00 ENCOUNTER FOR GENERAL ADULT MEDICAL EXAMINATION WITHOUT ABNORMAL FINDINGS: Primary | ICD-10-CM

## 2023-07-28 PROCEDURE — 3008F BODY MASS INDEX DOCD: CPT | Performed by: FAMILY MEDICINE

## 2023-07-28 PROCEDURE — 99395 PREV VISIT EST AGE 18-39: CPT | Performed by: FAMILY MEDICINE

## 2023-07-28 ASSESSMENT — ENCOUNTER SYMPTOMS
SHORTNESS OF BREATH: 0
VOMITING: 0
BLOOD IN STOOL: 0
COUGH: 0
DYSURIA: 0
NUMBNESS: 0
BACK PAIN: 0
CHILLS: 0
RHINORRHEA: 0
WEAKNESS: 0
DIZZINESS: 0
DIARRHEA: 0
CONSTIPATION: 0
FEVER: 0
NAUSEA: 0
PALPITATIONS: 0
FREQUENCY: 0
ADENOPATHY: 0
ABDOMINAL PAIN: 0
SORE THROAT: 0
ARTHRALGIAS: 0

## 2023-07-28 NOTE — ASSESSMENT & PLAN NOTE
Adult Male  Complaints - Thinks that he has ADHD. Will be seeing therapist and get tested, learn ways to manage. Will consider meds as last resort.  Diet - Healthy  Activity - Cardio, weights, yoga  Tobacco Use - None  EtOH Use - 4-5/week  Drug Use - None  Sexual Activity -   PMH - Asthma, Anxiety  FHX - M HTN; D Anxiety; MGM Rectal Cancer  Labs Ordered - CBC CMP LP  Immunizations Ordered/Recommended - None   Preventative Care Ordered/Recommended - None  Refused - None  Follow up -  1y

## 2023-07-28 NOTE — PATIENT INSTRUCTIONS
Diet - Try to limit portion sizes, take out, fast food, processed foods. Alcohol can be a expensive source of calories! If these are current problems for you, pick one area and focus on that first! There is no such thing as a perfect diet. If you are trying to lose weight, it will be difficult to do with foods you do not enjoy.  You may need to try a few different things before finding something.  In general, the diet with the best evidence is the Mediterranean diet. If you have high blood pressure, the DASH diet has good evidence to lower weight and BP.    Exercise - Goal should be 30-40 minutes, 3-4 times a week. If you are currently not exercising, set reachable goals with short term deadlines! The same goes with diet - you are less likely to be successful if you are doing something you don't enjoy.  Weights - even 1-2 pounds! - are great to strengthen bones in old adults.      Preventative Care Due - None    Labs Due Today - Yes    Shots Due Today - None    COVID Vaccine - Omicron booster available for all adults.  This vaccine can be given 2 months after most recent COVID booster. Schedule at www.vaccines.gov    Follow up - 1 year or sooner if anything comes up. We keep same-day sick appointments available every day for last minute problems.  If it is during the week - call us! Often times we can prevent an ER or UC visit! For certain problems, a telemedicine visit can be a great way to see me without having to come into the office or go to an UC!    If you have any specialists such as a Cardiologist, Gastroenterologist for a chronic problem, make sure you are following up with them as recommended!  Diabetics should be having a yearly eye exam by a Optometrist/Ophthalmologist and a foot exam by a Podiatrist!  Women should see their GYN yearly - though you may not need a pap smear done at each visit.  Those with a family history of skin cancer should see a Dermatologist annually.

## 2023-07-28 NOTE — PROGRESS NOTES
[address] [address]  [place] [address]  [phone]       Reason for visit:   Chief Complaint   Patient presents with   • Annual Exam      HPI   Laurent Gray is a 27 y.o. male who presents for his CPX   Medical Updates Yes Wife always mentions concern for ADHD. Has always had it.  Managed with it but interested in getting evaluated.  He is going to see a therapist soon.    Personal Updates No     Diet - Healthy  Exercise - Cardio, Body Weight    Smoke No   Alcohol Yes 4-5/wk  Drugs No     Lives with Wife  Work Teacher - Tech - [employer]     Hep A Due Yes   HPV Due No   TDAP Due No   Flu Due No   COVID Vaccination Due Yes   Lipids Due Yes   STDs Due No   Colonoscopy Due No     Specialists: None   Past Medical History:   Diagnosis Date   • Anxiety    • Asthma      Past Surgical History:   Procedure Laterality Date   • NASAL SEPTUM SURGERY       Social History     Socioeconomic History   • Marital status: Single     Spouse name: Not on file   • Number of children: Not on file   • Years of education: Not on file   • Highest education level: Not on file   Occupational History   • Not on file   Tobacco Use   • Smoking status: Never   • Smokeless tobacco: Never   Vaping Use   • Vaping Use: Never used   Substance and Sexual Activity   • Alcohol use: Yes     Comment: 1-2/week   • Drug use: No   • Sexual activity: Yes     Partners: Female     Birth control/protection: I.U.D.   Other Topics Concern   • Not on file   Social History Narrative    Do you wear your seatbelt? Yes    Do you have smoke detector in your home? Yes    Do you have a carbon monoxide detector in your home? 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 single     Social Determinants of Health     Financial Resource Strain: Not on file   Food Insecurity: Not on file   Transportation Needs: Not on file   Physical Activity: Not on file    Stress: Not on file   Social Connections: Not on file   Intimate Partner Violence: Not on file   Housing Stability: Not on file     Family History   Problem Relation Age of Onset   • Hypertension Biological Mother    • Anxiety disorder Biological Father    • Rectal cancer Maternal Grandmother    • Cancer Paternal Grandfather      No known allergies  Current Outpatient Medications   Medication Sig Dispense Refill   • albuterol HFA (VENTOLIN HFA) 90 mcg/actuation inhaler INHALE 2 PUFFS BY MOUTH EVERY 4 6 HOURS AS NEEDED FOR WHEEZING AND SHORTNESS OF BREATH AS DIRECTED       No current facility-administered medications for this visit.       Review of Systems   Constitutional: Negative for chills and fever.   HENT: Negative for congestion, hearing loss, rhinorrhea and sore throat.    Eyes: Negative for visual disturbance.   Respiratory: Negative for cough and shortness of breath.    Cardiovascular: Negative for chest pain and palpitations.   Gastrointestinal: Negative for abdominal pain, blood in stool, constipation, diarrhea, nausea and vomiting.   Genitourinary: Negative for dysuria, frequency and testicular pain.   Musculoskeletal: Negative for arthralgias and back pain.   Skin: Negative for rash.   Allergic/Immunologic: Negative for environmental allergies and food allergies.   Neurological: Negative for dizziness, weakness and numbness.   Hematological: Negative for adenopathy.     Objective   Vitals:    07/28/23 0940   BP: 121/70   BP Location: Right upper arm   Patient Position: Sitting   Pulse: 76   Temp: 36.7 °C (98 °F)   TempSrc: Temporal   SpO2: 98%   Weight: 90.3 kg (199 lb)   Height: 1.829 m (6')       Physical Exam  Vitals and nursing note reviewed.   Constitutional:       Appearance: Normal appearance. He is well-developed.   HENT:      Head: Normocephalic and atraumatic.      Right Ear: Tympanic membrane and ear canal normal.      Left Ear: Tympanic membrane and ear canal normal.      Nose: Nose  normal.   Eyes:      Conjunctiva/sclera: Conjunctivae normal.      Pupils: Pupils are equal, round, and reactive to light.   Neck:      Thyroid: No thyroid mass or thyromegaly.   Cardiovascular:      Rate and Rhythm: Normal rate and regular rhythm.      Pulses: Normal pulses.           Radial pulses are 2+ on the right side and 2+ on the left side.      Heart sounds: S1 normal and S2 normal. No murmur heard.     No friction rub. No gallop.   Pulmonary:      Effort: Pulmonary effort is normal.      Breath sounds: Normal breath sounds. No wheezing, rhonchi or rales.   Abdominal:      General: Bowel sounds are normal.      Palpations: Abdomen is soft.      Tenderness: There is no abdominal tenderness. There is no guarding or rebound.   Musculoskeletal:         General: Normal range of motion.      Right lower leg: No edema.      Left lower leg: No edema.   Lymphadenopathy:      Cervical: No cervical adenopathy.   Neurological:      Mental Status: He is alert and oriented to person, place, and time.      Cranial Nerves: No cranial nerve deficit.   Psychiatric:         Speech: Speech normal.         Behavior: Behavior normal.         Judgment: Judgment normal.         Procedures    Lab Results   Component Value Date    WBC 9.1 06/14/2021    HGB 14.8 06/14/2021    HCT 44.0 06/14/2021     06/14/2021    CHOL 189 06/14/2021    TRIG 63 06/14/2021    HDL 67 06/14/2021    ALT 13 06/14/2021    AST 21 06/14/2021     06/14/2021    K 4.7 06/14/2021     06/14/2021    CREATININE 1.07 06/14/2021    BUN 14 06/14/2021    CO2 26 06/14/2021    TSH 1.150 07/09/2020         Assessment   Problem List Items Addressed This Visit        Other    Encounter for general adult medical examination without abnormal findings - Primary     Adult Male  Complaints - Thinks that he has ADHD. Will be seeing therapist and get tested, learn ways to manage. Will consider meds as last resort.  Diet - Healthy  Activity - Cardio, weights,  yoga  Tobacco Use - None  EtOH Use - 4-5/week  Drug Use - None  Sexual Activity -   PMH - Asthma, Anxiety  FHX - M HTN; D Anxiety; MGM Rectal Cancer  Labs Ordered - CBC CMP LP  Immunizations Ordered/Recommended - None   Preventative Care Ordered/Recommended - None  Refused - None  Follow up -  1y          Relevant Orders    CBC and Differential    Comprehensive metabolic panel    Lipid panel           Sebastien Zamora MD  7/28/2023

## 2023-09-07 LAB
BASOPHILS # BLD AUTO: 0 X10E3/UL (ref 0–0.2)
BASOPHILS NFR BLD AUTO: 0 %
EOSINOPHIL # BLD AUTO: 0.1 X10E3/UL (ref 0–0.4)
EOSINOPHIL NFR BLD AUTO: 2 %
ERYTHROCYTE [DISTWIDTH] IN BLOOD BY AUTOMATED COUNT: 11.6 % (ref 11.6–15.4)
HCT VFR BLD AUTO: 41.1 % (ref 37.5–51)
HGB BLD-MCNC: 14.2 G/DL (ref 13–17.7)
IMM GRANULOCYTES # BLD AUTO: 0 X10E3/UL (ref 0–0.1)
IMM GRANULOCYTES NFR BLD AUTO: 0 %
LYMPHOCYTES # BLD AUTO: 1.3 X10E3/UL (ref 0.7–3.1)
LYMPHOCYTES NFR BLD AUTO: 30 %
MCH RBC QN AUTO: 30.8 PG (ref 26.6–33)
MCHC RBC AUTO-ENTMCNC: 34.5 G/DL (ref 31.5–35.7)
MCV RBC AUTO: 89 FL (ref 79–97)
MONOCYTES # BLD AUTO: 0.3 X10E3/UL (ref 0.1–0.9)
MONOCYTES NFR BLD AUTO: 8 %
NEUTROPHILS # BLD AUTO: 2.5 X10E3/UL (ref 1.4–7)
NEUTROPHILS NFR BLD AUTO: 60 %
PLATELET # BLD AUTO: 191 X10E3/UL (ref 150–450)
RBC # BLD AUTO: 4.61 X10E6/UL (ref 4.14–5.8)
WBC # BLD AUTO: 4.2 X10E3/UL (ref 3.4–10.8)

## 2023-09-08 LAB
ALBUMIN SERPL-MCNC: 5 G/DL (ref 4.3–5.2)
ALBUMIN/GLOB SERPL: 2.1 {RATIO} (ref 1.2–2.2)
ALP SERPL-CCNC: 55 IU/L (ref 44–121)
ALT SERPL-CCNC: 17 IU/L (ref 0–44)
AST SERPL-CCNC: 24 IU/L (ref 0–40)
BILIRUB SERPL-MCNC: 0.8 MG/DL (ref 0–1.2)
BUN SERPL-MCNC: 20 MG/DL (ref 6–20)
BUN/CREAT SERPL: 18 (ref 9–20)
CALCIUM SERPL-MCNC: 9.9 MG/DL (ref 8.7–10.2)
CHLORIDE SERPL-SCNC: 101 MMOL/L (ref 96–106)
CHOLEST SERPL-MCNC: 206 MG/DL (ref 100–199)
CO2 SERPL-SCNC: 21 MMOL/L (ref 20–29)
CREAT SERPL-MCNC: 1.11 MG/DL (ref 0.76–1.27)
EGFRCR SERPLBLD CKD-EPI 2021: 93 ML/MIN/1.73
GLOBULIN SER CALC-MCNC: 2.4 G/DL (ref 1.5–4.5)
GLUCOSE SERPL-MCNC: 60 MG/DL (ref 70–99)
HDLC SERPL-MCNC: 75 MG/DL
LDLC SERPL CALC-MCNC: 125 MG/DL (ref 0–99)
POTASSIUM SERPL-SCNC: 4 MMOL/L (ref 3.5–5.2)
PROT SERPL-MCNC: 7.4 G/DL (ref 6–8.5)
SODIUM SERPL-SCNC: 140 MMOL/L (ref 134–144)
TRIGL SERPL-MCNC: 32 MG/DL (ref 0–149)
VLDLC SERPL CALC-MCNC: 6 MG/DL (ref 5–40)

## 2023-09-25 ENCOUNTER — TELEPHONE (OUTPATIENT)
Dept: FAMILY MEDICINE | Facility: CLINIC | Age: 28
End: 2023-09-25

## 2023-09-25 NOTE — TELEPHONE ENCOUNTER
Insurance Referral Request   Patient PCP: Sebastien Zamora MD  Insurance Carrier: N/A  Specialist Name:   Provider Specialty: Ortho sports medicine  Provider NPI: [id number]  Office Location: [address] [place] [address]  Reason for Visit or Diagnosis Code: R69  Appointment Date: 9/25/23    Insurance Referral will be submitted to Insurance within 2 business days.

## 2023-09-28 NOTE — TELEPHONE ENCOUNTER
Patient called Ortho to schedule an appointment but they will not see him unless he has sometime type of imaging.

## 2023-10-12 ENCOUNTER — TRANSCRIBE ORDERS (OUTPATIENT)
Dept: REGISTRATION | Facility: HOSPITAL | Age: 28
End: 2023-10-12

## 2023-10-12 ENCOUNTER — HOSPITAL ENCOUNTER (OUTPATIENT)
Dept: RADIOLOGY | Facility: HOSPITAL | Age: 28
Discharge: HOME | End: 2023-10-12
Attending: ORTHOPAEDIC SURGERY
Payer: COMMERCIAL

## 2023-10-12 DIAGNOSIS — M25.561 PAIN IN RIGHT KNEE: Primary | ICD-10-CM

## 2023-10-12 DIAGNOSIS — M25.561 PAIN IN RIGHT KNEE: ICD-10-CM

## 2023-10-12 PROCEDURE — 73562 X-RAY EXAM OF KNEE 3: CPT | Mod: 50

## 2023-11-22 ENCOUNTER — TELEPHONE (OUTPATIENT)
Dept: FAMILY MEDICINE | Facility: CLINIC | Age: 28
End: 2023-11-22

## 2023-11-22 ENCOUNTER — APPOINTMENT (RX ONLY)
Dept: URBAN - METROPOLITAN AREA CLINIC 23 | Facility: CLINIC | Age: 28
Setting detail: DERMATOLOGY
End: 2023-11-22

## 2023-11-22 DIAGNOSIS — L81.4 OTHER MELANIN HYPERPIGMENTATION: ICD-10-CM

## 2023-11-22 DIAGNOSIS — Z87.2 PERSONAL HISTORY OF DISEASES OF THE SKIN AND SUBCUTANEOUS TISSUE: ICD-10-CM

## 2023-11-22 DIAGNOSIS — D22 MELANOCYTIC NEVI: ICD-10-CM

## 2023-11-22 DIAGNOSIS — D18.0 HEMANGIOMA: ICD-10-CM

## 2023-11-22 PROBLEM — D22.5 MELANOCYTIC NEVI OF TRUNK: Status: ACTIVE | Noted: 2023-11-22

## 2023-11-22 PROBLEM — D18.01 HEMANGIOMA OF SKIN AND SUBCUTANEOUS TISSUE: Status: ACTIVE | Noted: 2023-11-22

## 2023-11-22 PROCEDURE — ? SUNSCREEN RECOMMENDATIONS

## 2023-11-22 PROCEDURE — ? COUNSELING

## 2023-11-22 PROCEDURE — 99213 OFFICE O/P EST LOW 20 MIN: CPT

## 2023-11-22 PROCEDURE — ? FULL BODY SKIN EXAM

## 2023-11-22 ASSESSMENT — LOCATION ZONE DERM: LOCATION ZONE: TRUNK

## 2023-11-22 ASSESSMENT — LOCATION SIMPLE DESCRIPTION DERM
LOCATION SIMPLE: RIGHT UPPER BACK
LOCATION SIMPLE: UPPER BACK

## 2023-11-22 ASSESSMENT — LOCATION DETAILED DESCRIPTION DERM
LOCATION DETAILED: SUPERIOR THORACIC SPINE
LOCATION DETAILED: RIGHT SUPERIOR MEDIAL UPPER BACK
LOCATION DETAILED: RIGHT SUPERIOR UPPER BACK

## 2023-11-22 NOTE — TELEPHONE ENCOUNTER
Insurance Referral Request   Patient PCP: Sebastien Zamora MD  Insurance Carrier: N/A  Specialist Name: [facility]  Provider Specialty:Dermatology  Provider NPI: [id number]  Office Location: [address] [place] [address]  Reason for Visit or Diagnosis Code: Eval & Treat  Appointment Date: 11/22/23 at 1pm    Insurance Referral will be submitted to Insurance within 2 business days.

## 2023-11-22 NOTE — PROCEDURE: COUNSELING
Detail Level: Generalized
Patient Specific Counseling (Will Not Stick From Patient To Patient): .\\n.\\n.\\n.\\nfew years ago per patient, at outside derm, does not believe excision was necessary for any, no obvious excision scars present\\nhe will reach out to outside derm/derm path for records
Detail Level: Detailed

## 2023-11-22 NOTE — HPI: EVALUATION OF SKIN LESION(S)
What Type Of Note Output Would You Prefer (Optional)?: Bullet Format
Hpi Title: Evaluation of Skin Lesions
How Severe Are Your Spot(S)?: mild
Have Your Spot(S) Been Treated In The Past?: has not been treated
Additional History: Patient had an atypical nevus removed from his back in 2021.  Life insurance company likes to know if nevus was removed or still existing.

## 2023-11-28 ENCOUNTER — TRANSCRIBE ORDERS (OUTPATIENT)
Dept: SCHEDULING | Age: 28
End: 2023-11-28

## 2023-11-28 DIAGNOSIS — S83.249A OTHER TEAR OF MEDIAL MENISCUS, CURRENT INJURY, UNSPECIFIED KNEE, INITIAL ENCOUNTER: Primary | ICD-10-CM

## 2023-12-18 ENCOUNTER — HOSPITAL ENCOUNTER (OUTPATIENT)
Dept: RADIOLOGY | Age: 28
Discharge: HOME | End: 2023-12-18
Attending: ORTHOPAEDIC SURGERY
Payer: COMMERCIAL

## 2023-12-18 DIAGNOSIS — S83.249A OTHER TEAR OF MEDIAL MENISCUS, CURRENT INJURY, UNSPECIFIED KNEE, INITIAL ENCOUNTER: ICD-10-CM

## 2024-01-09 ENCOUNTER — TRANSCRIBE ORDERS (OUTPATIENT)
Dept: SCHEDULING | Age: 29
End: 2024-01-09

## 2024-01-11 ENCOUNTER — TRANSCRIBE ORDERS (OUTPATIENT)
Dept: SCHEDULING | Age: 29
End: 2024-01-11

## 2024-01-11 DIAGNOSIS — R00.2 PALPITATIONS: Primary | ICD-10-CM

## 2024-01-12 ENCOUNTER — TRANSCRIBE ORDERS (OUTPATIENT)
Dept: SCHEDULING | Age: 29
End: 2024-01-12

## 2024-01-12 DIAGNOSIS — R00.2 PALPITATIONS: Primary | ICD-10-CM

## 2024-01-23 ENCOUNTER — HOSPITAL ENCOUNTER (OUTPATIENT)
Dept: CARDIOLOGY | Facility: HOSPITAL | Age: 29
Discharge: HOME | End: 2024-01-23
Payer: COMMERCIAL

## 2024-01-23 DIAGNOSIS — R00.2 PALPITATIONS: ICD-10-CM

## 2024-01-23 PROCEDURE — 93005 ELECTROCARDIOGRAM TRACING: CPT

## 2024-01-24 LAB
ATRIAL RATE: 66
P AXIS: 60
PR INTERVAL: 160
QRS DURATION: 94
QT INTERVAL: 374
QTC CALCULATION(BAZETT): 392
R AXIS: 92
T WAVE AXIS: 40
VENTRICULAR RATE: 66

## 2024-02-07 ENCOUNTER — APPOINTMENT (RX ONLY)
Dept: URBAN - METROPOLITAN AREA CLINIC 23 | Facility: CLINIC | Age: 29
Setting detail: DERMATOLOGY
End: 2024-02-07

## 2024-02-07 DIAGNOSIS — L72.8 OTHER FOLLICULAR CYSTS OF THE SKIN AND SUBCUTANEOUS TISSUE: ICD-10-CM

## 2024-02-07 PROBLEM — D23.39 OTHER BENIGN NEOPLASM OF SKIN OF OTHER PARTS OF FACE: Status: ACTIVE | Noted: 2024-02-07

## 2024-02-07 PROCEDURE — 99212 OFFICE O/P EST SF 10 MIN: CPT

## 2024-02-07 PROCEDURE — ? DEFER

## 2024-02-07 PROCEDURE — ? COUNSELING

## 2024-02-07 ASSESSMENT — LOCATION ZONE DERM: LOCATION ZONE: EAR

## 2024-02-07 ASSESSMENT — LOCATION SIMPLE DESCRIPTION DERM: LOCATION SIMPLE: RIGHT EAR

## 2024-02-07 ASSESSMENT — LOCATION DETAILED DESCRIPTION DERM: LOCATION DETAILED: RIGHT POSTAURICULAR CREASE

## 2024-02-07 NOTE — HPI: SKIN LESION
What Type Of Note Output Would You Prefer (Optional)?: Standard Output
Is This A New Presentation, Or A Follow-Up?: Skin Lesion
Additional History: Patient claims it drained once.

## 2024-08-16 ENCOUNTER — OFFICE VISIT (OUTPATIENT)
Dept: FAMILY MEDICINE | Facility: CLINIC | Age: 29
End: 2024-08-16
Payer: COMMERCIAL

## 2024-08-16 VITALS
DIASTOLIC BLOOD PRESSURE: 78 MMHG | HEART RATE: 75 BPM | WEIGHT: 206.2 LBS | OXYGEN SATURATION: 98 % | HEIGHT: 72 IN | BODY MASS INDEX: 27.93 KG/M2 | TEMPERATURE: 97.2 F | SYSTOLIC BLOOD PRESSURE: 122 MMHG

## 2024-08-16 DIAGNOSIS — Z00.00 ENCOUNTER FOR GENERAL ADULT MEDICAL EXAMINATION WITHOUT ABNORMAL FINDINGS: Primary | ICD-10-CM

## 2024-08-16 DIAGNOSIS — J45.20 MILD INTERMITTENT ASTHMA WITHOUT COMPLICATION: ICD-10-CM

## 2024-08-16 PROBLEM — F90.9 ADHD: Status: ACTIVE | Noted: 2024-08-16

## 2024-08-16 PROCEDURE — 99395 PREV VISIT EST AGE 18-39: CPT | Performed by: FAMILY MEDICINE

## 2024-08-16 PROCEDURE — 3008F BODY MASS INDEX DOCD: CPT | Performed by: FAMILY MEDICINE

## 2024-08-16 RX ORDER — METHYLPHENIDATE HYDROCHLORIDE EXTENDED RELEASE 10 MG/1
10 TABLET ORAL DAILY
COMMUNITY

## 2024-08-16 ASSESSMENT — ENCOUNTER SYMPTOMS
DIARRHEA: 0
ADENOPATHY: 0
CHILLS: 0
PALPITATIONS: 0
BACK PAIN: 0
WEAKNESS: 0
NUMBNESS: 0
ARTHRALGIAS: 0
BLOOD IN STOOL: 0
FEVER: 0
FREQUENCY: 0
VOMITING: 0
SORE THROAT: 0
DYSURIA: 0
ABDOMINAL PAIN: 0
RHINORRHEA: 0
DIZZINESS: 0
NAUSEA: 0
CONSTIPATION: 0
COUGH: 0
SHORTNESS OF BREATH: 0

## 2024-08-16 ASSESSMENT — PATIENT HEALTH QUESTIONNAIRE - PHQ9: SUM OF ALL RESPONSES TO PHQ9 QUESTIONS 1 & 2: 0

## 2024-08-16 NOTE — ASSESSMENT & PLAN NOTE
Adult Male  Complaints - None  Diet - Healthy  Activity - Cardio, weights 4-5/wk  Tobacco Use - None  EtOH Use - 1-2/week  Drug Use - None  Sexual Activity -   PMH - Asthma, Anxiety, ADHD  FHX - M HTN; D Anxiety; MGM Rectal Cancer; PGF Colon  Labs Ordered - None   Immunizations Ordered/Recommended - None   Preventative Care Ordered/Recommended - None  Refused - None  Follow up -  1y

## 2024-08-16 NOTE — PROGRESS NOTES
[address] [address]  [phone]       Reason for visit:   Chief Complaint   Patient presents with    Annual Exam      HPI   Laurent Gray is a 28 y.o. male who presents for his CPX   Medical Updates No     Personal Updates Yes Daughter - 2.5 m old    Diet - Healthy  Exercise - Cardio, weights 4-5x/wk    Smoke No   Alcohol Yes 1-2/wk  Drugs No     Lives with Wife, Daughter  Work HS Tech Teacher    Hep A Due Yes   HPV Due No   TDAP Due No   Flu Due No   COVID Vaccination Due No   Lipids Due No   STDs Due No   Colonoscopy Due No     Specialists: Psychiatrist   Past Medical History:   Diagnosis Date    ADHD     Anxiety     Asthma      Past Surgical History:   Procedure Laterality Date    NASAL SEPTUM SURGERY       Social History     Socioeconomic History    Marital status: Single     Spouse name: Not on file    Number of children: Not on file    Years of education: Not on file    Highest education level: Not on file   Occupational History    Not on file   Tobacco Use    Smoking status: Never    Smokeless tobacco: Never   Vaping Use    Vaping Use: Never used   Substance and Sexual Activity    Alcohol use: Yes     Comment: 1-2/week    Drug use: No    Sexual activity: Yes     Partners: Female     Birth control/protection: I.U.D.   Other Topics Concern    Not on file   Social History Narrative    Do you wear your seatbelt? Yes    Do you have smoke detector in your home? Yes    Do you have a carbon monoxide detector in your home? 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 single     Social Determinants of Health     Financial Resource Strain: Not on file   Food Insecurity: Not on file   Transportation Needs: Not on file   Physical Activity: Not on file   Stress: Not on file   Social Connections: Not on file   Intimate Partner Violence: Not on file   Housing Stability: Not on file      Family History   Problem Relation Age of Onset    Hypertension Biological Mother     Anxiety disorder Biological Father     Rectal cancer Maternal Grandmother     Cancer Paternal Grandfather      No known allergies  Current Outpatient Medications   Medication Sig Dispense Refill    methylphenidate ER (METADATE ER) 10 mg ER tablet Take 10 mg by mouth daily.       No current facility-administered medications for this visit.       Review of Systems   Constitutional:  Negative for chills and fever.   HENT:  Negative for congestion, hearing loss, rhinorrhea and sore throat.    Eyes:  Negative for visual disturbance.   Respiratory:  Negative for cough and shortness of breath.    Cardiovascular:  Negative for chest pain and palpitations.   Gastrointestinal:  Negative for abdominal pain, blood in stool, constipation, diarrhea, nausea and vomiting.   Genitourinary:  Negative for dysuria, frequency and testicular pain.   Musculoskeletal:  Negative for arthralgias and back pain.   Skin:  Negative for rash.   Allergic/Immunologic: Negative for environmental allergies and food allergies.   Neurological:  Negative for dizziness, weakness and numbness.   Hematological:  Negative for adenopathy.     Objective   Vitals:    08/16/24 1315   BP: 122/78   BP Location: Right upper arm   Patient Position: Sitting   Pulse: 75   Temp: 36.2 °C (97.2 °F)   TempSrc: Temporal   SpO2: 98%   Weight: 93.5 kg (206 lb 3.2 oz)   Height: 1.829 m (6')       Physical Exam  Vitals and nursing note reviewed.   Constitutional:       Appearance: Normal appearance. He is well-developed.   HENT:      Head: Normocephalic and atraumatic.      Right Ear: Tympanic membrane and ear canal normal.      Left Ear: Tympanic membrane and ear canal normal.      Nose: Nose normal.   Eyes:      Conjunctiva/sclera: Conjunctivae normal.      Pupils: Pupils are equal, round, and reactive to light.   Neck:      Thyroid: No thyroid mass or thyromegaly.   Cardiovascular:       Rate and Rhythm: Normal rate and regular rhythm.      Pulses: Normal pulses.           Radial pulses are 2+ on the right side and 2+ on the left side.      Heart sounds: S1 normal and S2 normal. No murmur heard.     No friction rub. No gallop.   Pulmonary:      Effort: Pulmonary effort is normal.      Breath sounds: Normal breath sounds. No wheezing, rhonchi or rales.   Abdominal:      General: Bowel sounds are normal.      Palpations: Abdomen is soft.      Tenderness: There is no abdominal tenderness. There is no guarding or rebound.   Musculoskeletal:         General: Normal range of motion.      Right lower leg: No edema.      Left lower leg: No edema.   Lymphadenopathy:      Cervical: No cervical adenopathy.   Neurological:      Mental Status: He is alert and oriented to person, place, and time.      Cranial Nerves: No cranial nerve deficit.   Psychiatric:         Speech: Speech normal.         Behavior: Behavior normal.         Judgment: Judgment normal.         Procedures    Lab Results   Component Value Date    WBC 4.2 09/07/2023    HGB 14.2 09/07/2023    HCT 41.1 09/07/2023     09/07/2023    CHOL 206 (H) 09/07/2023    TRIG 32 09/07/2023    HDL 75 09/07/2023    ALT 17 09/07/2023    AST 24 09/07/2023     09/07/2023    K 4.0 09/07/2023     09/07/2023    CREATININE 1.11 09/07/2023    BUN 20 09/07/2023    CO2 21 09/07/2023    TSH 1.150 07/09/2020         Assessment   Problem List Items Addressed This Visit       Asthma    Encounter for general adult medical examination without abnormal findings - Primary     Adult Male  Complaints - None  Diet - Healthy  Activity - Cardio, weights 4-5/wk  Tobacco Use - None  EtOH Use - 1-2/week  Drug Use - None  Sexual Activity -   PMH - Asthma, Anxiety, ADHD  FHX - M HTN; D Anxiety; MGM Rectal Cancer; PGF Colon  Labs Ordered - None   Immunizations Ordered/Recommended - None   Preventative Care Ordered/Recommended - None  Refused - None  Follow up -  1y                    Sebastien Zamora MD  8/16/2024

## 2024-08-16 NOTE — PATIENT INSTRUCTIONS
My apologies if we were unable to draw your blood in the office today.  Our office had been immune to staffing shortages since I started working here 8 years ago.  Unfortunately, the last few months we have struggled due to unforeseen circumstances. We are working very hard to get back to full staff levels so we can get back to drawing your blood at visits. Thank you for your patience and understanding!    Diet - Try to limit portion sizes, take out, fast food, processed foods. Alcohol can be a expensive source of calories! If these are current problems for you, pick one area and focus on that first! There is no such thing as a perfect diet. If you are trying to lose weight, it will be difficult to do with foods you do not enjoy.  You may need to try a few different things before finding something.  In general, the diet with the best evidence is the Mediterranean diet. If you have high blood pressure, the DASH diet has good evidence to lower weight and BP. If you have Diabetes or Pre-Diabetes, limiting carbohydrate and sugar intake is essential.  Rice, breads, pasta, sodas are commonly forgotten about that could improve your numbers if you limit your intake.    Exercise - Goal should be 30-40 minutes, 3-4 times a week. If you are currently not exercising, set reachable goals with short term deadlines!  While walking your dog is great it usually is not enough to lead to significant weight loss.  To achieve the weight loss, you need sustained elevated heart rates, preferably over 100 beats per minute.  Weights - even 1-2 pounds! - are great to strengthen bones in older adults.      Preventative Care Due - None    Labs Due Today - None    Shots Due Today - None    COVID Vaccine - New Booster was released in September 2023. My office usually has it in stock    Preventative Care Recommendations:  Colonoscopy at 45 unless first degree family history of colon cancer  Mammograms yearly starting at 40 unless family history  of breast cancer  Prostate Screening at 50 unless first degree family history of prostate cancer  Pap smears are variable depending on social history, age, and past results - generally every 3-5 years    Vaccine Recommendations:  Shingrix - 2 shots 2-6 months apart at 50  Tdap - every 10 years  Hepatitis A - recommended for international travelers, food industry workers  Flu/COVID - yearly    Follow up - 1 year or sooner if anything comes up. We keep same-day sick appointments available every day for last minute problems.  If it is during the week - call us! Often times we can prevent an ER or UC visit! For certain problems, a telemedicine visit can be a great way to see me without having to come into the office or go to an UC!    If you have a chronic medical problem such as Hypertension, Diabetes, Heart Disease or have multiple chronic problems, it is likely I want to see you every 6 months.    If you have any specialists such as a Cardiologist, Gastroenterologist for a chronic problem, make sure you are following up with them as recommended!  Diabetics should be having a yearly eye exam by a Optometrist/Ophthalmologist and a foot exam by a Podiatrist!  Women of reproductive age should see their GYN yearly - though you may not need a pap smear done at each visit.  Those with a family history of skin cancer should see a Dermatologist annually.